# Patient Record
Sex: MALE | Race: WHITE | NOT HISPANIC OR LATINO | Employment: FULL TIME | URBAN - METROPOLITAN AREA
[De-identification: names, ages, dates, MRNs, and addresses within clinical notes are randomized per-mention and may not be internally consistent; named-entity substitution may affect disease eponyms.]

---

## 2018-02-12 ENCOUNTER — HOSPITAL ENCOUNTER (EMERGENCY)
Facility: HOSPITAL | Age: 43
Discharge: HOME | End: 2018-02-12
Attending: EMERGENCY MEDICINE

## 2018-02-12 VITALS
OXYGEN SATURATION: 97 % | DIASTOLIC BLOOD PRESSURE: 78 MMHG | BODY MASS INDEX: 35 KG/M2 | HEIGHT: 71 IN | HEART RATE: 74 BPM | SYSTOLIC BLOOD PRESSURE: 144 MMHG | RESPIRATION RATE: 16 BRPM | TEMPERATURE: 97 F | WEIGHT: 250 LBS
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
## 2018-02-12 DIAGNOSIS — S62.91XA CLOSED FRACTURE OF RIGHT HAND, INITIAL ENCOUNTER: Primary | ICD-10-CM

## 2018-02-12 PROCEDURE — 99283 EMERGENCY DEPT VISIT LOW MDM: CPT | Mod: 25

## 2018-02-12 PROCEDURE — 29125 APPL SHORT ARM SPLINT STATIC: CPT | Mod: RT

## 2018-02-12 PROCEDURE — 25000003 PHARM REV CODE 250: Performed by: PHYSICIAN ASSISTANT

## 2018-02-12 RX ORDER — OXYCODONE AND ACETAMINOPHEN 5; 325 MG/1; MG/1
1 TABLET ORAL EVERY 4 HOURS PRN
Qty: 15 TABLET | Refills: 0 | Status: SHIPPED | OUTPATIENT
Start: 2018-02-12 | End: 2018-03-12 | Stop reason: DRUGHIGH

## 2018-02-12 RX ORDER — IBUPROFEN 400 MG/1
800 TABLET ORAL
Status: COMPLETED | OUTPATIENT
Start: 2018-02-12 | End: 2018-02-12

## 2018-02-12 RX ADMIN — IBUPROFEN 800 MG: 400 TABLET, FILM COATED ORAL at 07:02

## 2018-02-13 NOTE — ED PROVIDER NOTES
Encounter Date: 2/12/2018       History     Chief Complaint   Patient presents with    Hand Injury     States fell down a few steps at around 2 pm today. Presents awake, alert with c/o pain and swelling to right hand. + painful ROM. No deformity.      Pb REAL, a 42 y.o. male that presents to the ED for right hand pain after he down a few steps and landed on the outside of his right hand around 2 PM this afternoon.  Since that time patient had increased pain with some swelling and some slight bruising.  Patient states that he has history of previous fracture requiring pinsto this hand when he was a teenager.  He is right-hand-dominant.  No treatments tried.        The history is provided by the patient.     Review of patient's allergies indicates:  No Known Allergies  History reviewed. No pertinent past medical history.  Past Surgical History:   Procedure Laterality Date    CERVICAL FUSION      HAND SURGERY Right     KNEE ARTHROSCOPY Bilateral     SHOULDER SURGERY Left     TONSILLECTOMY       History reviewed. No pertinent family history.  Social History   Substance Use Topics    Smoking status: Current Every Day Smoker    Smokeless tobacco: Never Used    Alcohol use Yes      Comment: social     Review of Systems   Constitutional: Negative for fever.   Gastrointestinal: Negative for nausea and vomiting.   Musculoskeletal: Positive for arthralgias (right hand pain ). Negative for joint swelling.   Skin: Positive for color change (to right hand ).   Allergic/Immunologic: Negative for immunocompromised state.   Neurological: Negative for dizziness, weakness and numbness.   Hematological: Negative for adenopathy.   Psychiatric/Behavioral: Negative for agitation and confusion.   All other systems reviewed and are negative.      Physical Exam     Initial Vitals [02/12/18 1651]   BP Pulse Resp Temp SpO2   (!) 156/92 92 15 97.4 °F (36.3 °C) 98 %      MAP       113.33         Physical Exam    Nursing note  and vitals reviewed.  Constitutional: He appears well-developed and well-nourished.   HENT:   Head: Normocephalic and atraumatic.   Right Ear: External ear normal.   Left Ear: External ear normal.   Nose: Nose normal.   Mouth/Throat: Oropharynx is clear and moist.   Eyes: EOM are normal.   Neck: Normal range of motion. Neck supple.   Cardiovascular: Normal rate and regular rhythm.   Pulmonary/Chest: Breath sounds normal. No respiratory distress.   Musculoskeletal: He exhibits no edema.        Right elbow: Normal.       Right wrist: Normal.        Right hand: He exhibits decreased range of motion, tenderness, bony tenderness and swelling. He exhibits normal capillary refill, no deformity and no laceration. Normal sensation noted. Normal strength noted.        Hands:  R hand: N/V intact    Lymphadenopathy:     He has no cervical adenopathy.   Neurological: He is alert and oriented to person, place, and time. No cranial nerve deficit.   Skin: Skin is warm and dry. Capillary refill takes less than 2 seconds. No rash and no abscess noted. No erythema.   Psychiatric: He has a normal mood and affect. Thought content normal.         ED Course   Splint Application  Date/Time: 2/12/2018 8:11 PM  Performed by: ABNER TAYLOR  Authorized by: SANTANA WALTERS   Location details: right hand  Splint type: ulnar gutter  Supplies used: plaster and cotton padding  Post-procedure: The splinted body part was neurovascularly unchanged following the procedure.  Patient tolerance: Patient tolerated the procedure well with no immediate complications  Comments: Splint placed by Stevenson Mathur         Labs Reviewed - No data to display     Imaging Results          X-Ray Hand 3 View Right (Final result)  Result time 02/12/18 17:12:18   Procedure changed from X-Ray Hand 2 View Right     Final result by Benny Mejia MD (02/12/18 17:12:18)                 Impression:      1.  Acute angulated comminuted intra-articular fracture of  the distal fifth carpal is above.      Electronically signed by: GRAZYNA SRIVASTAVA MD  Date:     02/12/18  Time:    17:12              Narrative:    Hand complete 3 view right    Clinical history: Trauma    Comparison: None    Findings:  3 views.    There is a comminuted fracture of the distal fifth metacarpal, with articular involvement.  There is diffuse edema about the hand.  Fracture fragments lie about the fracture plane.                                 Medical Decision Making:   Initial Assessment:   Right hand pain after fall   Differential Diagnosis:   Fracture, contusion, sprain, dislocation   Clinical Tests:   Radiological Study: Ordered and Reviewed  ED Management:  History presents to ED in NAD, afebrile and nontoxic.  He is TTP over fifth MCP with some swelling and mild bruising noted.  Motrin offered and pt declined.    X-ray shows acute angulated comminuted intra-articular fracture of the distal fifth carpal is above.  Ulnar gutter splint was applied.  Patient N/V intact.  She was instructed to follow-up hand surgeon for further evaluation.  Strict return precautions given and patient verbalized understanding.                 Attending Attestation:     Physician Attestation Statement for NP/PA:   I discussed this assessment and plan of this patient with the NP/PA, but I did not personally examine the patient. The face to face encounter was performed by the NP/PA.                  ED Course      Clinical Impression:   The primary encounter diagnosis was Closed fracture of right hand, initial encounter. A diagnosis of Fall was also pertinent to this visit.                           LIU DonaldsonC  02/12/18 2012       Kevin Shaw MD  02/12/18 2019

## 2018-02-13 NOTE — ED NOTES
Right hand pain after falling down steps around 2 pm today.  Hx of fracture to right 5th metacarpal at 16.  Pain increases with movement.  Swelling, redness and tenderness to 4th and 5th digits, cap refill < 2 sec, nailbeds pink.

## 2018-02-21 ENCOUNTER — TELEPHONE (OUTPATIENT)
Dept: ORTHOPEDICS | Facility: CLINIC | Age: 43
End: 2018-02-21

## 2018-02-21 NOTE — TELEPHONE ENCOUNTER
I spoke with Katlin and made the patient a new patient appointment with dr. Dutta. She was made aware of date, time and location.

## 2018-02-21 NOTE — TELEPHONE ENCOUNTER
----- Message from Jany Ortega sent at 2/21/2018 12:24 PM CST -----  Contact:  Katlin [phone]  Katlin requesting to speak with you regarding patient's worker's comp appt. Please call and advise.

## 2018-02-22 ENCOUNTER — OFFICE VISIT (OUTPATIENT)
Dept: ORTHOPEDICS | Facility: CLINIC | Age: 43
End: 2018-02-22
Payer: OTHER MISCELLANEOUS

## 2018-02-22 ENCOUNTER — HOSPITAL ENCOUNTER (OUTPATIENT)
Dept: RADIOLOGY | Facility: HOSPITAL | Age: 43
Discharge: HOME | End: 2018-02-22
Attending: ORTHOPAEDIC SURGERY
Payer: OTHER MISCELLANEOUS

## 2018-02-22 VITALS — WEIGHT: 250 LBS | HEIGHT: 71 IN | BODY MASS INDEX: 35 KG/M2

## 2018-02-22 DIAGNOSIS — M79.641 PAIN OF RIGHT HAND: Primary | ICD-10-CM

## 2018-02-22 DIAGNOSIS — S62.91XD CLOSED FRACTURE OF RIGHT HAND WITH ROUTINE HEALING: ICD-10-CM

## 2018-02-22 DIAGNOSIS — M79.641 PAIN OF RIGHT HAND: ICD-10-CM

## 2018-02-22 PROCEDURE — 73130 X-RAY EXAM OF HAND: CPT | Mod: TC,FY,RT

## 2018-02-22 PROCEDURE — 99204 OFFICE O/P NEW MOD 45 MIN: CPT | Mod: S$GLB,,, | Performed by: ORTHOPAEDIC SURGERY

## 2018-02-22 PROCEDURE — 99999 PR PBB SHADOW E&M-EST. PATIENT-LVL III: CPT | Mod: PBBFAC,,, | Performed by: ORTHOPAEDIC SURGERY

## 2018-02-22 PROCEDURE — 73130 X-RAY EXAM OF HAND: CPT | Mod: 26,RT,, | Performed by: RADIOLOGY

## 2018-02-22 PROCEDURE — 3008F BODY MASS INDEX DOCD: CPT | Mod: S$GLB,,, | Performed by: ORTHOPAEDIC SURGERY

## 2018-02-22 RX ORDER — OXYCODONE AND ACETAMINOPHEN 7.5; 325 MG/1; MG/1
1 TABLET ORAL EVERY 4 HOURS PRN
Qty: 40 TABLET | Refills: 0 | Status: SHIPPED | OUTPATIENT
Start: 2018-02-22 | End: 2018-03-12 | Stop reason: SDUPTHER

## 2018-02-22 NOTE — LETTER
February 22, 2018        Aaareferral Alli             [facility]  [address] [place] [address] [address]  [place] [address]  Phone: [phone]   Patient: Pb Montenegro   MR Number: [id number]   YOB: 1975   Date of Visit: 2/22/2018       Dear Dr. Carson:    Thank you for referring Pb Montenegro to me for evaluation. Below are the relevant portions of my assessment and plan of care.            If you have questions, please do not hesitate to call me. I look forward to following Pb along with you.    Sincerely,      Larry Dutta Jr., MD           CC  No Recipients

## 2018-02-22 NOTE — PROGRESS NOTES
INITIAL VISIT AND PREOP H AND P    CHIEF COMPLAINT:  Right hand fracture.    HISTORY OF PRESENT ILLNESS:  A 42-year-old male sustained injury to his right   hand eight days ago when he fell at work, landing on his right hand sustaining   injury to the hand.  He has had pain and swelling since, was referred for   possible surgery on the hand.    PAST MEDICAL HISTORY:  Unremarkable.    PAST SURGICAL HISTORY:  Include shoulder surgery, neck fusion, hand surgery,   knee arthroscopy and tonsillectomy.    FAMILY HISTORY:  Negative.    SOCIAL HISTORY:  The patient smokes daily, drinks alcohol socially.    REVIEW OF SYSTEMS:  Negative fever, chills, rashes.    CURRENT MEDICATIONS:  Reviewed on chart.    ALLERGIES:  None.    PHYSICAL EXAMINATION:  GENERAL:  Well-developed, well-nourished male in no acute distress, alert and   oriented x3.  HEENT:  Unremarkable.  LUNGS:  Clear to auscultation.  HEART:  Regular rate and rhythm.  ABDOMEN:  Soft, nontender.  EXTREMITIES:  Significant for the right hand, demonstrating tenderness, swelling   over the ulnar side of the hand in the fifth MP joint.  Clinical alignment of   the small finger is good.  Sensation intact.  No obvious deformity.  Range of   motion limited.    X-RAYS:  AP and lateral of the right hand demonstrate a comminuted fracture of   the fifth metacarpal head.  There is involvement of the joint.  There is some   comminution and displacement.    IMPRESSION:  Severely comminuted right fifth metacarpal head and neck fracture.    PLAN:  I explained the nature of problem to the patient.  I think this is a   difficult choice here on whether we do surgery or not, and I explained the   reasons.  Basically if we do not operate, he will end up with stiffness and   probably early arthritis of the joint.  On the other hand, if we proceed with   surgery for ORIF, we might be able to avoid some arthritic changes, but the   stiffness may be actually worse.  Either case will require  extensive physical   therapy and I explained that to him today.  He understands.    We have given this some consideration.  He would like to proceed with surgery   next week for ORIF right fifth metacarpal head and neck fracture.  The risks and   benefits of surgery explained to the patient.  In the meantime, he was given an   ulnar gutter splint, light duty work, Percocet for pain.  Follow up next week   for surgery.      MATY  dd: 02/22/2018 09:45:27 (CST)  td: 02/23/2018 04:29:09 (CST)  Doc ID   #[id number]  Job ID #[id number]    CC:

## 2018-02-22 NOTE — LETTER
February 22, 2018        Rubi Trejo MD  [address] [facility] [address]             [facility]  [address] [place] [address] [address]  [facility] [address]  Phone: [phone]   Patient: Pb Montenegro   MR Number: [id number]   YOB: 1975   Date of Visit: 2/22/2018       Dear Dr. Trejo:    Thank you for referring Pb Montenegro to me for evaluation. Below are the relevant portions of my assessment and plan of care.            If you have questions, please do not hesitate to call me. I look forward to following Pb along with you.    Sincerely,      Larry Dutta Jr., MD           CC  No Recipients

## 2018-02-26 ENCOUNTER — ANESTHESIA EVENT (OUTPATIENT)
Dept: SURGERY | Facility: HOSPITAL | Age: 43
End: 2018-02-26
Payer: OTHER MISCELLANEOUS

## 2018-02-26 ENCOUNTER — HOSPITAL ENCOUNTER (OUTPATIENT)
Dept: PREADMISSION TESTING | Facility: HOSPITAL | Age: 43
Discharge: HOME | End: 2018-02-26
Attending: ORTHOPAEDIC SURGERY
Payer: OTHER MISCELLANEOUS

## 2018-02-26 ENCOUNTER — CLINICAL SUPPORT (OUTPATIENT)
Dept: LAB | Facility: HOSPITAL | Age: 43
End: 2018-02-26
Attending: ORTHOPAEDIC SURGERY
Payer: OTHER MISCELLANEOUS

## 2018-02-26 VITALS
WEIGHT: 251 LBS | HEIGHT: 70 IN | TEMPERATURE: 99 F | HEART RATE: 78 BPM | DIASTOLIC BLOOD PRESSURE: 93 MMHG | BODY MASS INDEX: 35.93 KG/M2 | OXYGEN SATURATION: 98 % | RESPIRATION RATE: 18 BRPM | SYSTOLIC BLOOD PRESSURE: 133 MMHG

## 2018-02-26 DIAGNOSIS — Z01.818 PRE-OP TESTING: Primary | ICD-10-CM

## 2018-02-26 DIAGNOSIS — Z01.818 PRE-OP TESTING: ICD-10-CM

## 2018-02-26 PROCEDURE — 93005 ELECTROCARDIOGRAM TRACING: CPT

## 2018-02-26 PROCEDURE — 93010 ELECTROCARDIOGRAM REPORT: CPT | Mod: ,,, | Performed by: INTERNAL MEDICINE

## 2018-02-26 PROCEDURE — 93010 EKG 12-LEAD: ICD-10-PCS | Mod: ,,, | Performed by: INTERNAL MEDICINE

## 2018-02-26 RX ORDER — SODIUM CHLORIDE, SODIUM LACTATE, POTASSIUM CHLORIDE, CALCIUM CHLORIDE 600; 310; 30; 20 MG/100ML; MG/100ML; MG/100ML; MG/100ML
INJECTION, SOLUTION INTRAVENOUS CONTINUOUS
Status: CANCELLED | OUTPATIENT
Start: 2018-02-26

## 2018-02-26 RX ORDER — LIDOCAINE HYDROCHLORIDE 10 MG/ML
1 INJECTION, SOLUTION EPIDURAL; INFILTRATION; INTRACAUDAL; PERINEURAL ONCE
Status: CANCELLED | OUTPATIENT
Start: 2018-02-26 | End: 2018-02-26

## 2018-02-26 NOTE — ANESTHESIA PREPROCEDURE EVALUATION
02/26/2018  Pb Montenegro is a 42 y.o., male is scheduled for ORIF right metacarpal under reg/MAC/gen on 3/02/2018.        Past Surgical History:   Procedure Laterality Date    CERVICAL FUSION  2010    CLOSED REDUCTION HAND FRACTURE Right 1991    KNEE ARTHROSCOPY Bilateral 1990's    OPEN ANTERIOR SHOULDER RECONSTRUCTION Left 1993    TONSILLECTOMY           Anesthesia Evaluation    I have reviewed the Patient Summary Reports.    I have reviewed the Nursing Notes.   I have reviewed the Medications.     Review of Systems  Anesthesia Hx:  No problems with previous Anesthesia  History of prior surgery of interest to airway management or planning: cervical fusion. Previous anesthesia: General  Denies Personal Hx of Anesthesia complications.   Social:  Smoker, Social Alcohol Use    Hematology/Oncology:  Hematology Normal        EENT/Dental:EENT/Dental Normal   Cardiovascular:   Exercise tolerance: good Denies Hypertension.  Denies Dysrhythmias.   Denies Angina.        Pulmonary:   Denies Shortness of breath.    Renal/:  Renal/ Normal     Hepatic/GI:   GERD, well controlled    Musculoskeletal:   Brace to right hand   Neurological:  Neurology Normal    Endocrine:  Endocrine Normal        Physical Exam  General:  Well nourished, Large Beard    Airway/Jaw/Neck:  Airway Findings: Mouth Opening: Normal Tongue: Normal  General Airway Assessment: Adult  Mallampati: II  TM Distance: Normal, at least 6 cm  Jaw/Neck Findings:  Neck ROM: Extension Decreased, Mild  Neck Findings: Normal    Eyes/Ears/Nose:  EYES/EARS/NOSE FINDINGS: Normal   Dental:  Dental Findings: In tact   Chest/Lungs:  Chest/Lungs Clear    Heart/Vascular:  Heart Findings: Normal Heart murmur: negative    Abdomen:  Abdomen Findings: Normal    Musculoskeletal:  Musculoskeletal Findings: (right 4th and 5th metacarpal; brace in place) Tender Joint,  Swollen Joint     Mental Status:  Mental Status Findings: Normal        Anesthesia Plan  Type of Anesthesia, risks & benefits discussed:  Anesthesia Type:  MAC, regional, general  Patient's Preference:   Intra-op Monitoring Plan: standard ASA monitors  Intra-op Monitoring Plan Comments:   Post Op Pain Control Plan: per primary service following discharge from PACU, peripheral nerve block and multimodal analgesia  Post Op Pain Control Plan Comments:   Induction:   IV  Beta Blocker:  Patient is not currently on a Beta-Blocker (No further documentation required).       Informed Consent: Patient understands risks and agrees with Anesthesia plan.  Questions answered. Anesthesia consent signed with patient.  ASA Score: 2     Day of Surgery Review of History & Physical:  There are no significant changes.      Anesthesia Plan Notes: Anesthesia consent will be obtained prior to procedure on 3/02/2018.        Ready For Surgery From Anesthesia Perspective.

## 2018-02-26 NOTE — DISCHARGE INSTRUCTIONS
· Arrive on  3/2/2018  at  11:00 .  · Report to the 2nd floor Procedural Check In Room .   · Nothing to eat or drink after 5 AM the day of your procedure.  · You may take pain medications the morning of surgery with a small sip of water.                                                         · Please remove all body piercings and leave all your jewelery and valuables at home .  · Please remove your contact lenses.   · Wear loose comfortable clothing.  · You will not be able to drive that day, please make arrangement for transportation to and from your procedure.  · You cannot be alone for 24 hours after anesthesia. Make arrangements as needed.  · Shower the night before your procedure and the morning of your procedure with Hibiclens antibacterial solution.  · No lotions, creams or powders  · Stop Aspirin, Ibuprofen, Advil, Motrin, Aleve, Nuprin, Naprosyn (all NSAID Medication) or any other blood thinners 5 days before your procedure unless directed by your physician.  Also hold all over the counter vitamins and herbal supplements for 5 days prior to your procedure.  · You may take Tylenol or Acetaminophen up the day of surgery for any pain.        Call [phone] with any questions.          Anesthesia: General Anesthesia  Youre due to have surgery. During surgery, youll be given medication called anesthesia. (It is also called anesthetic.) This will keep you comfortable and pain-free. Your anesthesia provider will use general anesthesia. This sheet tells you more about it.  What is general anesthesia?    General anesthesia puts you into a state like deep sleep. It goes into the bloodstream (IV anesthetics), into the lungs (gas anesthetics), or both. You feel nothing during the procedure. You will not remember it. During the procedure, the anesthesia provider monitors you continuously. He or she checks your heart rate and rhythm, blood pressure, breathing, and blood oxygen.  · IV Anesthetics. IV anesthetics are  given through an IV line in your arm. Theyre often given first. This is so you are asleep before a gas anesthetic is started. Some kinds of IV anesthetics relieve pain. Others relax you. Your doctor will decide which kind is best in your case.  · Gas Anesthetics. Gas anesthetics are breathed into the lungs. They are often used to keep you asleep. They can be given through a facemask or a tube placed in your larynx or trachea (breathing tube).  · If you have a facemask, your anesthesia provider will most likely place it over your nose and mouth while youre still awake. Youll breathe oxygen through the mask as your IV anesthetic is started. Gas anesthetic may be added through the mask.  · If you have a tube in the larynx or trachea, it will be inserted into your throat after youre asleep.  Anesthesia tools and medications  You will likely have:  · IV anesthetics. These are put into an IV line into your bloodstream.  · Gas anesthetics. You breathe these anesthetics into your lungs, where they pass into your bloodstream.  · Pulse oximeter. This is a small clip that is attached to the end of your finger. This measures your blood oxygen level.  · Electrocardiography leads (electrodes). These are small sticky pads that are placed on your chest. They record your heart rate and rhythm.  · Blood pressure cuff. This reads your blood pressure.    Anesthesia safety  · Follow all instructions you are given for how long not to eat or drink before your procedure.  · Be sure your doctor knows what medications and drugs you take. This includes over-the-counter medications, herbs, supplements, alcohol or other drugs. You will be asked when those were last taken.  · Have an adult family member or friend drive you home after the procedure.  · For the first 24 hours after your surgery:  · Do not drive or use heavy equipment.  · Have a trusted family member or spouse make important decisions or sign documents.  · Avoid  alcohol.  · Have a responsible adult stay with you. He or she can watch for problems and help keep you safe.  © 8124-7951 [employer]. [address] [place] [address], [place] [address]. All rights reserved. This information is not intended as a substitute for professional medical care. Always follow your healthcare professional's instructions.        Pre-Op Bathing Instructions    Before surgery, you can play an important role in your own health.    Because skin is not sterile, we need to be sure that your skin is as free of germs as possible. By following the instructions below, you can reduce the number of germs on your skin before surgery.    IMPORTANT: You will need to shower with a special soap called Hibiclens*, available at any pharmacy, over the counter usually in the first aid isle.  If you are allergic to Chlorhexidine (the antiseptic in Hibiclens), use an antibacterial soap such as Dial Soap for your preoperative showers.  You will shower with Hibiclens the night before and the morning of surgery. Both the night before your surgery and the morning of your surgery see steps 2 and 3.   Do not use Hibiclens on the head, face or genitals to avoid injury to those areas.    STEP #1  1.  Shower with Hibiclens solution night before and the morning of surgery.      STEP #2: THE NIGHT BEFORE YOUR SURGERY     1. Do not shave the area of your body where your surgery will be performed.  2. Wash your hair as usual with your normal  Shampoo. Wash body shoulder to toes with your normal soap.  3. Squeeze Hibiclens into hand apply to surgical site.   4. Wash the site gently for five (5) minutes. Do not scrub your skin too hard.   5. Do not wash with your regular soap after Hibiclens is used.  6. Rinse your body thoroughly.  7. Pat yourself dry with a clean, soft towel.  8. Do not use lotion, cream, or powder.  9. Wear clean clothes.    STEP #3: THE MORNING OF YOUR SURGERY     1. Repeat Step #2.    * Not to be used by  people allergic to Chlorhexidine.

## 2018-03-01 DIAGNOSIS — S62.90XD CLOSED FRACTURE OF HAND WITH ROUTINE HEALING, UNSPECIFIED LATERALITY, SUBSEQUENT ENCOUNTER: Primary | ICD-10-CM

## 2018-03-02 ENCOUNTER — ANESTHESIA (OUTPATIENT)
Dept: SURGERY | Facility: HOSPITAL | Age: 43
End: 2018-03-02
Payer: OTHER MISCELLANEOUS

## 2018-03-02 ENCOUNTER — HOSPITAL ENCOUNTER (OUTPATIENT)
Facility: HOSPITAL | Age: 43
Discharge: HOME | End: 2018-03-02
Attending: ORTHOPAEDIC SURGERY | Admitting: ORTHOPAEDIC SURGERY
Payer: OTHER MISCELLANEOUS

## 2018-03-02 ENCOUNTER — SURGERY (OUTPATIENT)
Age: 43
End: 2018-03-02

## 2018-03-02 VITALS
HEIGHT: 70 IN | BODY MASS INDEX: 35.79 KG/M2 | RESPIRATION RATE: 20 BRPM | DIASTOLIC BLOOD PRESSURE: 85 MMHG | SYSTOLIC BLOOD PRESSURE: 129 MMHG | OXYGEN SATURATION: 98 % | WEIGHT: 250 LBS | TEMPERATURE: 98 F | HEART RATE: 65 BPM

## 2018-03-02 DIAGNOSIS — S62.91XD CLOSED FRACTURE OF RIGHT HAND WITH ROUTINE HEALING: ICD-10-CM

## 2018-03-02 DIAGNOSIS — M79.641 PAIN OF RIGHT HAND: ICD-10-CM

## 2018-03-02 PROCEDURE — 37000008 HC ANESTHESIA 1ST 15 MINUTES: Performed by: ORTHOPAEDIC SURGERY

## 2018-03-02 PROCEDURE — 71000016 HC POSTOP RECOV ADDL HR: Performed by: ORTHOPAEDIC SURGERY

## 2018-03-02 PROCEDURE — 36000708 HC OR TIME LEV III 1ST 15 MIN: Performed by: ORTHOPAEDIC SURGERY

## 2018-03-02 PROCEDURE — 36000706: Performed by: ORTHOPAEDIC SURGERY

## 2018-03-02 PROCEDURE — 63600175 PHARM REV CODE 636 W HCPCS: Performed by: ORTHOPAEDIC SURGERY

## 2018-03-02 PROCEDURE — 25000003 PHARM REV CODE 250: Performed by: NURSE PRACTITIONER

## 2018-03-02 PROCEDURE — 76942 ECHO GUIDE FOR BIOPSY: CPT | Performed by: ANESTHESIOLOGY

## 2018-03-02 PROCEDURE — 63600175 PHARM REV CODE 636 W HCPCS: Performed by: NURSE ANESTHETIST, CERTIFIED REGISTERED

## 2018-03-02 PROCEDURE — 36000709 HC OR TIME LEV III EA ADD 15 MIN: Performed by: ORTHOPAEDIC SURGERY

## 2018-03-02 PROCEDURE — 71000015 HC POSTOP RECOV 1ST HR: Performed by: ORTHOPAEDIC SURGERY

## 2018-03-02 PROCEDURE — 36000707: Performed by: ORTHOPAEDIC SURGERY

## 2018-03-02 PROCEDURE — 37000009 HC ANESTHESIA EA ADD 15 MINS: Performed by: ORTHOPAEDIC SURGERY

## 2018-03-02 PROCEDURE — 26608 TREAT METACARPAL FRACTURE: CPT | Mod: RT,,, | Performed by: ORTHOPAEDIC SURGERY

## 2018-03-02 PROCEDURE — C1769 GUIDE WIRE: HCPCS | Performed by: ORTHOPAEDIC SURGERY

## 2018-03-02 PROCEDURE — 63600175 PHARM REV CODE 636 W HCPCS: Performed by: ANESTHESIOLOGY

## 2018-03-02 PROCEDURE — 25000003 PHARM REV CODE 250: Performed by: ORTHOPAEDIC SURGERY

## 2018-03-02 DEVICE — GUIDEWIRE 1.25MM X 150MM: Type: IMPLANTABLE DEVICE | Site: HAND | Status: FUNCTIONAL

## 2018-03-02 RX ORDER — LIDOCAINE HYDROCHLORIDE 10 MG/ML
1 INJECTION, SOLUTION EPIDURAL; INFILTRATION; INTRACAUDAL; PERINEURAL ONCE
Status: DISCONTINUED | OUTPATIENT
Start: 2018-03-02 | End: 2018-03-02 | Stop reason: HOSPADM

## 2018-03-02 RX ORDER — OXYCODONE HYDROCHLORIDE 5 MG/1
10 TABLET ORAL EVERY 4 HOURS PRN
Status: DISCONTINUED | OUTPATIENT
Start: 2018-03-02 | End: 2018-03-02 | Stop reason: HOSPADM

## 2018-03-02 RX ORDER — PROPOFOL 10 MG/ML
VIAL (ML) INTRAVENOUS CONTINUOUS PRN
Status: DISCONTINUED | OUTPATIENT
Start: 2018-03-02 | End: 2018-03-02

## 2018-03-02 RX ORDER — SODIUM CHLORIDE, SODIUM LACTATE, POTASSIUM CHLORIDE, CALCIUM CHLORIDE 600; 310; 30; 20 MG/100ML; MG/100ML; MG/100ML; MG/100ML
INJECTION, SOLUTION INTRAVENOUS CONTINUOUS
Status: DISCONTINUED | OUTPATIENT
Start: 2018-03-02 | End: 2018-03-02 | Stop reason: HOSPADM

## 2018-03-02 RX ORDER — LIDOCAINE HCL/PF 100 MG/5ML
SYRINGE (ML) INTRAVENOUS
Status: DISCONTINUED | OUTPATIENT
Start: 2018-03-02 | End: 2018-03-02

## 2018-03-02 RX ORDER — KETOROLAC TROMETHAMINE 30 MG/ML
30 INJECTION, SOLUTION INTRAMUSCULAR; INTRAVENOUS ONCE
Status: DISCONTINUED | OUTPATIENT
Start: 2018-03-02 | End: 2018-03-02 | Stop reason: HOSPADM

## 2018-03-02 RX ORDER — CEFAZOLIN SODIUM 2 G/50ML
2 SOLUTION INTRAVENOUS
Status: DISCONTINUED | OUTPATIENT
Start: 2018-03-02 | End: 2018-03-02 | Stop reason: HOSPADM

## 2018-03-02 RX ORDER — ACETAMINOPHEN 325 MG/1
650 TABLET ORAL EVERY 4 HOURS PRN
Status: DISCONTINUED | OUTPATIENT
Start: 2018-03-02 | End: 2018-03-02 | Stop reason: HOSPADM

## 2018-03-02 RX ORDER — BACITRACIN 50000 [IU]/1
INJECTION, POWDER, FOR SOLUTION INTRAMUSCULAR
Status: DISCONTINUED | OUTPATIENT
Start: 2018-03-02 | End: 2018-03-02 | Stop reason: HOSPADM

## 2018-03-02 RX ORDER — HYDROCODONE BITARTRATE AND ACETAMINOPHEN 7.5; 325 MG/1; MG/1
1 TABLET ORAL EVERY 4 HOURS PRN
Qty: 40 TABLET | Refills: 0 | Status: SHIPPED | OUTPATIENT
Start: 2018-03-02 | End: 2018-03-26 | Stop reason: SDUPTHER

## 2018-03-02 RX ORDER — MIDAZOLAM HYDROCHLORIDE 1 MG/ML
INJECTION, SOLUTION INTRAMUSCULAR; INTRAVENOUS
Status: DISCONTINUED | OUTPATIENT
Start: 2018-03-02 | End: 2018-03-02

## 2018-03-02 RX ORDER — ONDANSETRON 8 MG/1
8 TABLET, ORALLY DISINTEGRATING ORAL EVERY 8 HOURS PRN
Status: DISCONTINUED | OUTPATIENT
Start: 2018-03-02 | End: 2018-03-02 | Stop reason: HOSPADM

## 2018-03-02 RX ORDER — FENTANYL CITRATE 50 UG/ML
INJECTION, SOLUTION INTRAMUSCULAR; INTRAVENOUS
Status: DISCONTINUED | OUTPATIENT
Start: 2018-03-02 | End: 2018-03-02

## 2018-03-02 RX ADMIN — SODIUM CHLORIDE, SODIUM LACTATE, POTASSIUM CHLORIDE, AND CALCIUM CHLORIDE: .6; .31; .03; .02 INJECTION, SOLUTION INTRAVENOUS at 09:03

## 2018-03-02 RX ADMIN — BACITRACIN 50000 UNITS: 5000 INJECTION, POWDER, FOR SOLUTION INTRAMUSCULAR at 11:03

## 2018-03-02 RX ADMIN — MIDAZOLAM 5 MG: 1 INJECTION INTRAMUSCULAR; INTRAVENOUS at 09:03

## 2018-03-02 RX ADMIN — LIDOCAINE HYDROCHLORIDE 100 MG: 20 INJECTION, SOLUTION INTRAVENOUS at 11:03

## 2018-03-02 RX ADMIN — CEFAZOLIN SODIUM 2 G: 2 SOLUTION INTRAVENOUS at 11:03

## 2018-03-02 RX ADMIN — FENTANYL CITRATE 100 MCG: 50 INJECTION, SOLUTION INTRAMUSCULAR; INTRAVENOUS at 11:03

## 2018-03-02 RX ADMIN — PROPOFOL 150 MCG/KG/MIN: 10 INJECTION, EMULSION INTRAVENOUS at 11:03

## 2018-03-02 NOTE — DISCHARGE INSTRUCTIONS
***LEAVE DRESSING ON, CLEAN, DRY, AND INTACT UNTIL CLINIC VISIT.  ***KEEP EXTREMITY ELEVATED.    DIET: You may resume your home diet. If nausea is present, increase your diet gradually with fluids and bland foods    ACTIVITY LEVEL: You have received sedation or an anesthetic, you may feel sleepy for several hours. Rest until you are more awake. Gradually resume your normal activities    Medications: Pain medication should be taken only if needed and as directed. If antibiotics are prescribed, the medication should be taken until completed. You will be given an updated list of you medications.    No driving, alcoholic beverages or signing legal documents for next 24 hours or while taking pain medication.       CALL THE DOCTOR:    For any obvious bleeding (some dried blood over the incision is normal).      Redness, swelling, foul smell around incision or fever over 101.   Shortness of breath, Coughing up Bloody sputum, Pains or Swelling in your Calves .   Persistent pain or nausea not relieved by medication.    If any unusual problems or difficulties occur contact your doctor. If you cannot contact your doctor but feel your signs and symptoms warrant a physicians attention return to the emergency room.      Discharge Instructions: After Your Surgery  Youve just had surgery. During surgery, you were given medicine called anesthesia to keep you relaxed and free of pain. After surgery, you may have some pain or nausea. This is common. Here are some tips for feeling better and getting well after surgery.     Stay on schedule with your medicine.   Going home  Your healthcare provider will show you how to take care of yourself when you go home. He or she will also answer your questions. Have an adult family member or friend drive you home. For the first 24 hours after your surgery:  · Do not drive or use heavy equipment.  · Do not make important decisions or sign legal papers.  · Do not drink alcohol.  · Have someone  stay with you, if needed. He or she can watch for problems and help keep you safe.  Be sure to go to all follow-up visits with your healthcare provider. And rest after your surgery for as long as your healthcare provider tells you to.  Coping with pain  If you have pain after surgery, pain medicine will help you feel better. Take it as told, before pain becomes severe. Also, ask your healthcare provider or pharmacist about other ways to control pain. This might be with heat, ice, or relaxation. And follow any other instructions your surgeon or nurse gives you.  Tips for taking pain medicine  To get the best relief possible, remember these points:  · Pain medicines can upset your stomach. Taking them with a little food may help.  · Most pain relievers taken by mouth need at least 20 to 30 minutes to start to work.  · Taking medicine on a schedule can help you remember to take it. Try to time your medicine so that you can take it before starting an activity. This might be before you get dressed, go for a walk, or sit down for dinner.  · Constipation is a common side effect of pain medicines. Call your healthcare provider before taking any medicines such as laxatives or stool softeners to help ease constipation. Also ask if you should skip any foods. Drinking lots of fluids and eating foods such as fruits and vegetables that are high in fiber can also help. Remember, do not take laxatives unless your surgeon has prescribed them.  · Drinking alcohol and taking pain medicine can cause dizziness and slow your breathing. It can even be deadly. Do not drink alcohol while taking pain medicine.  · Pain medicine can make you react more slowly to things. Do not drive or run machinery while taking pain medicine.  Your healthcare provider may tell you to take acetaminophen to help ease your pain. Ask him or her how much you are supposed to take each day. Acetaminophen or other pain relievers may interact with your prescription  medicines or other over-the-counter (OTC) medicines. Some prescription medicines have acetaminophen and other ingredients. Using both prescription and OTC acetaminophen for pain can cause you to overdose. Read the labels on your OTC medicines with care. This will help you to clearly know the list of ingredients, how much to take, and any warnings. It may also help you not take too much acetaminophen. If you have questions or do not understand the information, ask your pharmacist or healthcare provider to explain it to you before you take the OTC medicine.  Managing nausea  Some people have an upset stomach after surgery. This is often because of anesthesia, pain, or pain medicine, or the stress of surgery. These tips will help you handle nausea and eat healthy foods as you get better. If you were on a special food plan before surgery, ask your healthcare provider if you should follow it while you get better. These tips may help:  · Do not push yourself to eat. Your body will tell you when to eat and how much.  · Start off with clear liquids and soup. They are easier to digest.  · Next try semi-solid foods, such as mashed potatoes, applesauce, and gelatin, as you feel ready.  · Slowly move to solid foods. Dont eat fatty, rich, or spicy foods at first.  · Do not force yourself to have 3 large meals a day. Instead eat smaller amounts more often.  · Take pain medicines with a small amount of solid food, such as crackers or toast, to avoid nausea.     Call your surgeon if  · You still have pain an hour after taking medicine. The medicine may not be strong enough.  · You feel too sleepy, dizzy, or groggy. The medicine may be too strong.  · You have side effects like nausea, vomiting, or skin changes, such as rash, itching, or hives.       If you have obstructive sleep apnea  You were given anesthesia medicine during surgery to keep you comfortable and free of pain. After surgery, you may have more apnea spells because  of this medicine and other medicines you were given. The spells may last longer than usual.   At home:  · Keep using the continuous positive airway pressure (CPAP) device when you sleep. Unless your healthcare provider tells you not to, use it when you sleep, day or night. CPAP is a common device used to treat obstructive sleep apnea.  · Talk with your provider before taking any pain medicine, muscle relaxants, or sedatives. Your provider will tell you about the possible dangers of taking these medicines.  Date Last Reviewed: 12/1/2016  © 5757-5003 [employer]. [address] [place] [address]. All rights reserved. This information is not intended as a substitute for professional medical care. Always follow your healthcare professional's instructions.

## 2018-03-02 NOTE — ANESTHESIA PROCEDURE NOTES
Peripheral    Patient location during procedure: pre-op   Block not for primary anesthetic.  Reason for block: at surgeon's request and post-op pain management   Post-op Pain Location: right hand  Start time: 3/2/2018 9:30 AM  Timeout: 3/2/2018 9:30 AM   End time: 3/2/2018 9:36 AM  Surgery related to: right hand  Staffing  Anesthesiologist: JORDYN ESCOBAR  Performed: anesthesiologist   Preanesthetic Checklist  Completed: patient identified, site marked, surgical consent, pre-op evaluation, timeout performed, IV checked, risks and benefits discussed and monitors and equipment checked  Peripheral Block  Patient position: supine  Prep: ChloraPrep  Patient monitoring: heart rate, cardiac monitor, continuous pulse ox, continuous capnometry and frequent blood pressure checks  Block type: axillary  Laterality: right  Injection technique: single shot  Needle  Needle type: Stimuplex   Needle gauge: 21 G  Needle length: 4 in  Needle localization: anatomical landmarks and ultrasound guidance   -ultrasound image captured on disc.  Assessment  Injection assessment: negative aspiration and negative parasthesia  Paresthesia pain: none  Heart rate change: no  Slow fractionated injection: yes  Medications:  Bolus administered: 30 mL of 0.5 ropivacaine  Epinephrine added: none  Additional Notes  VSS.  [facility] RN monitoring vitals throughout procedure.  Patient tolerated procedure well.

## 2018-03-02 NOTE — ANESTHESIA POSTPROCEDURE EVALUATION
"Anesthesia Post Evaluation    Patient: Pb Montenegro    Procedure(s) Performed: Procedure(s) (LRB):  OPEN REDUCTION INTERNAL FIXATION-METACARPALS (Right)    Final Anesthesia Type: MAC  Patient location during evaluation: PACU  Patient participation: Yes- Able to Participate  Level of consciousness: awake and alert, oriented and awake  Post-procedure vital signs: reviewed and stable  Pain management: adequate  Airway patency: patent  PONV status at discharge: No PONV  Anesthetic complications: no      Cardiovascular status: blood pressure returned to baseline  Respiratory status: unassisted and room air  Hydration status: euvolemic  Follow-up not needed.        Visit Vitals  /79   Pulse 78   Temp 36.6 °C (97.9 °F) (Skin)   Resp 16   Ht 5' 10" (1.778 m)   Wt 113.4 kg (250 lb)   SpO2 (!) 94%   BMI 35.87 kg/m²       Pain/Tabby Score: Pain Assessment Performed: Yes (3/2/2018  9:13 AM)  Presence of Pain: complains of pain/discomfort (3/2/2018  9:13 AM)  Tabby Score: 10 (3/2/2018  9:45 AM)      "

## 2018-03-02 NOTE — H&P (VIEW-ONLY)
INITIAL VISIT AND PREOP H AND P    CHIEF COMPLAINT:  Right hand fracture.    HISTORY OF PRESENT ILLNESS:  A 42-year-old male sustained injury to his right   hand eight days ago when he fell at work, landing on his right hand sustaining   injury to the hand.  He has had pain and swelling since, was referred for   possible surgery on the hand.    PAST MEDICAL HISTORY:  Unremarkable.    PAST SURGICAL HISTORY:  Include shoulder surgery, neck fusion, hand surgery,   knee arthroscopy and tonsillectomy.    FAMILY HISTORY:  Negative.    SOCIAL HISTORY:  The patient smokes daily, drinks alcohol socially.    REVIEW OF SYSTEMS:  Negative fever, chills, rashes.    CURRENT MEDICATIONS:  Reviewed on chart.    ALLERGIES:  None.    PHYSICAL EXAMINATION:  GENERAL:  Well-developed, well-nourished male in no acute distress, alert and   oriented x3.  HEENT:  Unremarkable.  LUNGS:  Clear to auscultation.  HEART:  Regular rate and rhythm.  ABDOMEN:  Soft, nontender.  EXTREMITIES:  Significant for the right hand, demonstrating tenderness, swelling   over the ulnar side of the hand in the fifth MP joint.  Clinical alignment of   the small finger is good.  Sensation intact.  No obvious deformity.  Range of   motion limited.    X-RAYS:  AP and lateral of the right hand demonstrate a comminuted fracture of   the fifth metacarpal head.  There is involvement of the joint.  There is some   comminution and displacement.    IMPRESSION:  Severely comminuted right fifth metacarpal head and neck fracture.    PLAN:  I explained the nature of problem to the patient.  I think this is a   difficult choice here on whether we do surgery or not, and I explained the   reasons.  Basically if we do not operate, he will end up with stiffness and   probably early arthritis of the joint.  On the other hand, if we proceed with   surgery for ORIF, we might be able to avoid some arthritic changes, but the   stiffness may be actually worse.  Either case will require  extensive physical   therapy and I explained that to him today.  He understands.    We have given this some consideration.  He would like to proceed with surgery   next week for ORIF right fifth metacarpal head and neck fracture.  The risks and   benefits of surgery explained to the patient.  In the meantime, he was given an   ulnar gutter splint, light duty work, Percocet for pain.  Follow up next week   for surgery.      MATY  dd: 02/22/2018 09:45:27 (CST)  td: 02/23/2018 04:29:09 (CST)  Doc ID   #[id number]  Job ID #[id number]    CC:

## 2018-03-02 NOTE — OP NOTE
DATE OF PROCEDURE:  03/02/2018.    PREOPERATIVE DIAGNOSIS:  Right fifth metacarpal neck fracture.    POSTOPERATIVE DIAGNOSIS:  Right fifth metacarpal neck fracture.    OPERATIVE PROCEDURE:  Closed reduction and percutaneous pin fixation of right   fifth metacarpal neck fracture.    SURGEON:  Larry Dutta Jr., M.D.    ANESTHESIA:  Regional block.    ESTIMATED BLOOD LOSS:  Minimal.    COMPLICATIONS:  None.    SPECIMENS:  None.    BRIEF INDICATIONS:  This is a 42-year-old male who sustained right hand fracture   fifth metacarpal slightly displaced, taken to surgery for the above procedure.    OPERATIVE PROCEDURE IN DETAIL:  After operative consent was obtained, the   patient was brought to the Operating Room and placed supine on the operating   table.  Anesthesia by regional block was performed by the Anesthesia staff.    After the right arm was anesthetized, it was prepped and draped out in the   normal sterile fashion.  The C-arm brought in and closed reduction maneuver   performed on the right fifth metacarpal shaft and neck.  It appeared that the   head was intact for the most part, but just displaced slightly.  It was   carefully maneuvered back into place and two K wires were placed, one retrograde   and one antegrade, checked under C-arm control, good purchase achieved.  The   fracture appears stable and good range of motion of the finger.  The pins were   cut short above the skin, capped with pin caps, sterile dressing applied   followed by a well-padded ulnar gutter splint.  The patient was then brought to   the Recovery Room in stable condition.  All sponge and needle counts reported as   correct.  No complications.      NEPTALI/BRITTNEY  dd: 03/02/2018 12:03:17 (CST)  td: 03/02/2018 16:16:06 (CST)  Doc ID   #[id number]  Job ID #[id number]    CC:

## 2018-03-02 NOTE — PLAN OF CARE
Discharge criteria met,voicing desire to go home. Discharge instructions given to patient per TALYA Aguila Rn/ops; verbalized understanding. Discharge home via wheelchair in care of wife.

## 2018-03-12 ENCOUNTER — HOSPITAL ENCOUNTER (OUTPATIENT)
Dept: RADIOLOGY | Facility: HOSPITAL | Age: 43
Discharge: HOME | End: 2018-03-12
Attending: ORTHOPAEDIC SURGERY
Payer: OTHER MISCELLANEOUS

## 2018-03-12 ENCOUNTER — OFFICE VISIT (OUTPATIENT)
Dept: ORTHOPEDICS | Facility: CLINIC | Age: 43
End: 2018-03-12
Payer: OTHER MISCELLANEOUS

## 2018-03-12 DIAGNOSIS — M79.643 PAIN OF HAND, UNSPECIFIED LATERALITY: Primary | ICD-10-CM

## 2018-03-12 DIAGNOSIS — M79.641 PAIN OF RIGHT HAND: ICD-10-CM

## 2018-03-12 DIAGNOSIS — M79.643 PAIN OF HAND, UNSPECIFIED LATERALITY: ICD-10-CM

## 2018-03-12 DIAGNOSIS — S62.91XD CLOSED FRACTURE OF RIGHT HAND WITH ROUTINE HEALING: ICD-10-CM

## 2018-03-12 PROCEDURE — 73130 X-RAY EXAM OF HAND: CPT | Mod: TC,FY,RT

## 2018-03-12 PROCEDURE — 99024 POSTOP FOLLOW-UP VISIT: CPT | Mod: S$GLB,,, | Performed by: ORTHOPAEDIC SURGERY

## 2018-03-12 PROCEDURE — 99999 PR PBB SHADOW E&M-EST. PATIENT-LVL II: CPT | Mod: PBBFAC,,, | Performed by: ORTHOPAEDIC SURGERY

## 2018-03-12 PROCEDURE — 73130 X-RAY EXAM OF HAND: CPT | Mod: 26,RT,, | Performed by: RADIOLOGY

## 2018-03-12 RX ORDER — OXYCODONE AND ACETAMINOPHEN 7.5; 325 MG/1; MG/1
1 TABLET ORAL EVERY 4 HOURS PRN
Qty: 40 TABLET | Refills: 0 | Status: SHIPPED | OUTPATIENT
Start: 2018-03-12 | End: 2018-04-23

## 2018-03-12 NOTE — PROGRESS NOTES
HISTORY OF PRESENT ILLNESS:  Mr. Montenegro is about 11 days' postop percutaneous   pinning of fifth metacarpal neck fracture.  He is having now quite a bit of pain   in the right hand.  After removal of the splint, one of the pins has sort of   become loose and migrated underneath the skin.  The other pin is in good   position, no evidence of infection or anything.  Some swelling is noted.  He has   extreme pain with any manipulation of the hand.  Sensation intact.  No   drainage.    X-RAYS:  AP and lateral of right hand demonstrate well-aligned fifth metacarpal   neck fracture.  Two pins in place.    PLAN:  The migrating pin needed to be removed in the office today.  I injected   him with 1% Xylocaine and following this, I was able to catch the pin with the   needle  and remove it in the office, it was loose.  No problems with that   procedure.  We then wrapped up the other pin and put him in a well-padded ulnar   gutter splint.  I would like him to wear this full-time for the next three days   then he can take it off and start gentle bathing and showering.  Follow up in   two weeks for a recheck.      MATY  dd: 03/12/2018 14:19:54 (CDT)  td: 03/13/2018 07:40:25 (CDT)  Doc ID   #[id number]  Job ID #[id number]    CC:

## 2018-03-13 ENCOUNTER — CLINICAL SUPPORT (OUTPATIENT)
Dept: REHABILITATION | Facility: HOSPITAL | Age: 43
End: 2018-03-13
Payer: OTHER MISCELLANEOUS

## 2018-03-13 DIAGNOSIS — M79.641 RIGHT HAND PAIN: ICD-10-CM

## 2018-03-13 DIAGNOSIS — M25.60 DECREASED RANGE OF MOTION: ICD-10-CM

## 2018-03-13 PROCEDURE — 97165 OT EVAL LOW COMPLEX 30 MIN: CPT | Mod: PO

## 2018-03-13 PROCEDURE — 97110 THERAPEUTIC EXERCISES: CPT | Mod: PO

## 2018-03-13 NOTE — PATIENT INSTRUCTIONS
[facility]  OCCUPATIONAL THERAPY  HOME EXERCISE PROGRAM     Complete the following massages for 3-5 minutes each, 2-3x/day.                       Complete the following exercises with 10-15 repetitions each, 4-6x/day.                                                                                                   Therapist: LIZZ Quiros

## 2018-03-13 NOTE — PLAN OF CARE
Occupational Therapy -Hand / Wrist  Evaluation    Patient: Pb Montenegro  Date of Evaluation: 3/13/2018  Referring Physician:  Dr. Larry Dutta  Diagnosis: s/p CRPP of R 5th metacarpal  1. Right hand pain     2. Decreased range of motion       MRN: [id number]    Referral Orders:   Eval and treat     Start Time: 8:05  End Time: 9:00  Total Time: 55 min  IE x 45 min, TE x 10 min     Hand dominance: Right    Occupation:  Pt is a  with [employer]  Working presently:  yes  Workmen's Compensation:  yes    Date of onset: 3/2/18 sx date, initial injury on 2/12/18  Involved area:  R hand, 5th metacarpal  Mechanism of Injury:   Fell down stairs while at work  Past Medical History/Physical Systems Review:   Past Medical History:   Diagnosis Date    Arthritis      Past Surgical History:   Procedure Laterality Date    CERVICAL FUSION  2010    CLOSED REDUCTION HAND FRACTURE Right 1991    KNEE ARTHROSCOPY Bilateral 1990's    OPEN ANTERIOR SHOULDER RECONSTRUCTION Left 1993    TONSILLECTOMY           Living status: Pt lives with his wife and his 3 children.     Environmental Concerns/ Fall Risk:  None  Barriers to Learning: None   Cultural/Spiritual : None   Developmental/Education: None   Abuse/ Neglect: none   Nutritional Deficit: None   Language: None   Hearing/Vision Deficit: None   Other:     Subjective:  Pt reports I still have a lot of pain. The doctor had to take a pin out yesterday.    Pain   At rest: 6 out of 10  With work/ Activity: 8-9 out of 10  Sleeping: did not rate  Location of Pain : R hand at SF, RF, and 5th metacarpal    Patients goals for therapy are:  to have less pain and more function of R hand    Objective:   Observation  :Swelling, Redness noted at SF, dry skin and mild bruising still noted at R hand, especially SF and RF  **Pt wearing ulnar gutter brace on R hand     Sensation: pt reports numbness in R hand at fingertips and thumb especially; light touch grossly  intact  Scar / Wound: healing wound at 4th and 5th MP, blue cap at pin at ulnar aspect of 5th metacarpal,     Edema:  R hand    Ring / Small (in cm)  PP: 7.5 / 7.2   PIP: 7.2 / 6.7   MP : 6.5 / 6.3   DIP: 5.7 /. 5.7  DP: 5.5 / 5.3     MP's: 23.2  PPC: 24.4  PWC: 18.5        Range of Motion:   Wrist flex/ext: 64 / 60  RD/UD: 12 / 26    ROM: Thumb   /   Index   /   Middle   / Ring  /  Small   MP   WFL/WFL;   0/82;       0/72 ;      0/62;    0/25  PIP  WFL/WFL;    0/64;       0/67 ;      0/55;  -14/30  DIP        -----;        0/22;       0/22 ;      0/18;    -5/10  MOBLEY   WFL;         168;         161;       135;      46                                                    Manual Muscle Test: NT at this time                                       Strength: (KEITH Dynamometer in lbs.), Average 3 trials, Position II: TBA when appropriate      Functional Limitations:   Self Care / ADL: Limited use of R hand for ADLs, grooming, hygiene  Work/Activities: Limited use of R hand for writing, typing, opening jars or bottles, cleaning, yardwork  Leisure: Limited use of R hand for leisure activities    Focus on Therapeutic Outcomes (FOTO) Symptom Scale: Patient score indicates self perception of 57% impairment, limitation or restriction of function upon initial assessment.       Treatment Included:   OT evaluation and instruction in written HEP including retrograde massage, hand hygiene, gentle joint blocking to FDS/FDP of digits, gentle tendon glides (intrinsic -/+, composite fist) as tolerated, finger abd/add, digit ext as tolerated; and modalities for pain and inflammation management. Pt performed HEP x 5-10 reps each as tolerated.    Patient demonstrates good understanding of HEP/modality use for pain management.      Assessment:   Pt is a 43 y/o male with dx of s/p CRPP of R 5th metacarpal.  Pt presents with the below mentioned problems. Pt still currently working, but not having to use his R hand.  Pt with good  participation and motivation for recovery.  Pt would cont to benefit from skilled OT services to improve deficits and maximize functional use of R hand.    Problem List :        Decreased ROM   Decreased  strength   Decreased muscle strength   Decreased functional hand use   Increased pain   Edema   Scar Adhesions       Profile and History Assessment of Occupational Performance Level of Clinical Decision Making Complexity Score   Occupational Profile:   Pb Montenegro is a 42 y.o. male who lives with their family and is currently employed as a  for [employer]. Pb Montenegro has difficulty with  feeding, bathing, grooming and dressing  driving/transportation management and housework/household chores  affecting his/her daily functional abilities. His/her main goal for therapy is to have less pain and more function of R hand. Dominant hand affected    Comorbidities:   n/a    Medical and Therapy History Review:   Brief    LOW           Performance Deficits    Physical:  Joint Mobility  Joint Stability  Muscle Power/Strength  Muscle Endurance  Skin Integrity/Scar Formation  Edema   Strength  Gross Motor Coordination  Fine Motor Coordination  Pain    Cognitive:  No Deficits    Psychosocial:    No Deficits    MODERATE Clinical Decision Making:  low    Assessment Process:  Comprehensive  Assessments    Modification/Need for Assistance:  Not Necessary    Intervention Selection:  Multiple Treatment Options    LOW   low  Based on PMHX, co morbidities , data from assessments and functional level of assistance required with task and clinical presentation directly impacting function.       STGs (4 weeks):  1) initiate HEP, and modalities for pain and inflammation management  2)  Increase ROM of digits 10-15 degrees to increase functional hand use for ADLs/work/leisure activities  3)  Pt will report pain of no higher than 4/10 at the worst with daily functional activities  4)  Decrease edema  .2-.3 mm to increase joint mobility /flexibility for functional hand use.     Long term Goals: (discharge)   1)  Patient to be IND with HEP and modalities for pain management  2)  Pt will demonstrate functional composite fist of R hand in order to perform functional ADLs  3)   strength to be assessed when appropriate, and goals to be set accordingly  4) Pt will report little to no pain with daily functional activities  5)   Pt will present with little to no edema in R hand  6)  Pt will achieve FOTO score of no higher than 35% limitation with functional use of R hand    Plan:   Patient to be treated by Occupational Therapy    2    times per week for   8                   weeks  during the certification period from   3/13/2018     to  5/13/18   to achieve the established goals.  Treatment to include :  paraffin, fluidotherapy, manual therapy/joint mobilizations,  modalities for pain management, US 3mhz, therapeutic exercises/activities, strengthening, scar and edema management, as well as any other treatments deemed necessary based on the patient's needs or progress.           Therapist: LIZZ Quiros  Date: 3/13/18          I certify the need for these services furnished under this plan of treatment and while under my care    ____________________________________                         __________________  Physician/Referring Practitioner                                               Date of Signature

## 2018-03-22 ENCOUNTER — CLINICAL SUPPORT (OUTPATIENT)
Dept: REHABILITATION | Facility: HOSPITAL | Age: 43
End: 2018-03-22
Payer: OTHER MISCELLANEOUS

## 2018-03-22 DIAGNOSIS — M25.60 DECREASED RANGE OF MOTION: ICD-10-CM

## 2018-03-22 DIAGNOSIS — M79.641 RIGHT HAND PAIN: ICD-10-CM

## 2018-03-22 PROCEDURE — 97140 MANUAL THERAPY 1/> REGIONS: CPT | Mod: PO

## 2018-03-22 PROCEDURE — 97110 THERAPEUTIC EXERCISES: CPT | Mod: PO

## 2018-03-26 ENCOUNTER — OFFICE VISIT (OUTPATIENT)
Dept: ORTHOPEDICS | Facility: CLINIC | Age: 43
End: 2018-03-26
Payer: OTHER MISCELLANEOUS

## 2018-03-26 ENCOUNTER — HOSPITAL ENCOUNTER (OUTPATIENT)
Dept: RADIOLOGY | Facility: HOSPITAL | Age: 43
Discharge: HOME | End: 2018-03-26
Attending: ORTHOPAEDIC SURGERY
Payer: OTHER MISCELLANEOUS

## 2018-03-26 VITALS — HEIGHT: 70 IN | BODY MASS INDEX: 35.79 KG/M2 | WEIGHT: 250 LBS

## 2018-03-26 DIAGNOSIS — S62.91XD CLOSED FRACTURE OF RIGHT HAND WITH ROUTINE HEALING: ICD-10-CM

## 2018-03-26 DIAGNOSIS — M79.643 PAIN OF HAND, UNSPECIFIED LATERALITY: Primary | ICD-10-CM

## 2018-03-26 DIAGNOSIS — M79.643 PAIN OF HAND, UNSPECIFIED LATERALITY: ICD-10-CM

## 2018-03-26 PROCEDURE — 73120 X-RAY EXAM OF HAND: CPT | Mod: 26,RT,, | Performed by: RADIOLOGY

## 2018-03-26 PROCEDURE — 99999 PR PBB SHADOW E&M-EST. PATIENT-LVL III: CPT | Mod: PBBFAC,,, | Performed by: ORTHOPAEDIC SURGERY

## 2018-03-26 PROCEDURE — 73120 X-RAY EXAM OF HAND: CPT | Mod: TC,FY,RT

## 2018-03-26 PROCEDURE — 99024 POSTOP FOLLOW-UP VISIT: CPT | Mod: S$GLB,,, | Performed by: ORTHOPAEDIC SURGERY

## 2018-03-26 RX ORDER — HYDROCODONE BITARTRATE AND ACETAMINOPHEN 7.5; 325 MG/1; MG/1
1 TABLET ORAL EVERY 4 HOURS PRN
Qty: 40 TABLET | Refills: 0 | Status: SHIPPED | OUTPATIENT
Start: 2018-03-26 | End: 2019-04-30

## 2018-03-26 NOTE — PROGRESS NOTES
HISTORY OF PRESENT ILLNESS:  Mr. Montenegro in followup of percutaneous pin   fixation of fifth metacarpal neck fracture.  He is about three and half weeks'   postop, still having a lot of pain in the hand.  Previously, one of his pins   migrated and had to be removed.  He is back today with another pin which has   migrated under the skin.  Having pain from the pin site.    PHYSICAL EXAMINATION:  RIGHT HAND:  The remaining pin has migrated underneath the skin similar to the   previous pin.  Clinical alignment looks good.  No evidence of infection.    Sensation intact.  Range of motion limited.    X-RAYS:  AP and lateral right hand, demonstrates healing fracture of the fifth   metacarpal neck.  One pin in place.    PLAN:  I think we really do not have a choice but to remove the remaining pin   today.  He is having so much difficulty with these pins.    The pin was removed in the office without difficulty under sterile technique.    Following this, he was placed in a light bandage, placed back into the ulnar   gutter splint, which I would like him to wear at work, he can have it off at   home.  We will also order some OT for his right hand to start range of motion   exercises.  No heavy lifting.  Norco for pain.  Follow up in three weeks.      MATY  dd: 03/26/2018 14:15:20 (CDT)  td: 03/27/2018 11:14:15 (CDT)  Doc ID   #[id number]  Job ID #[id number]    CC:

## 2018-03-26 NOTE — PROGRESS NOTES
"OT DAILY TREATMENT NOTE      Start Time:  9:00  Stop Time:  9:50  Visit: 2/12; expires 3/1/19    PROCEDURES:    TIMED  Procedure Min.   TE 30   MT 10                 UNTIMED  Procedure Min.   Paraffin  10          Total Timed Minutes:  40  Total Timed Units:  3  Total Untimed Units:  1  Charges Billed/# of units:  1MT, 2TE      Progress/Current Status    Subjective:     Patient ID: Pb Montenegro is a 42 y.o. male.  Diagnosis:   1. Right hand pain     2. Decreased range of motion       Pain: 7-8 /10  Pt states "I've still been having a lot of pain. And my hand is still swollen."  Pt reports compliance with HEP and orthosis wear.    Objective:     Pt is 20 days status post, 1 pin remaining; wearing orthosis to clinic.    Pt seen by OT this session. Treatment consisted of the following:    Patient received paraffin bath to R hand(s) for 10 minutes to increase blood flow, circulation, pain management and for tissue elasticity prior to therex.   Performed RM to R digits/hand/wrist (avoiding the pin) x 8 min to decrease edema, improve joint mobility, decrease pain and improve lymphatic drainage to increase hand function. Pt received gentle joint mobs to IPs, grade 1-2. Pt received gentle PROM to IPs and IF &LF MPs x 10-15 reps each.   Date: 3/22/18    Visit: 2/12; expires 3/1/19   Wrist AROM (flex/ext/RD/UD) 10 reps   Tendon glides as tolerated, (intrinsic -/+, composite fist) 10 reps   Joint blocking to FDS/FDP of all digits 10 reps each   Reverse joint blocking to RF and SF PIP 10 reps each   AAROM of MP flex, all digits 10 reps   Finger abd/add 10 reps, as tolerated                 Assessment:     Pt tolerated tx fairly today. Cont c/o high pain and swelling in hand.  Mild redness and bruising still noted of small finger. Improved AROM of digits this date. SF AROM cont to be limited especially at MP, but slowly improving. Cont to be NWB.  Pt with good HEP and orthosis compliance. Pt reports he missed last visit due " to work schedule difficulties. Pt cont to present with moderate edema, stiffness, decreased ROM, pain, and limited use of R hand.  Pt progressing fairly well towards goals. Pt would cont to benefit from skilled OT services to maximize functional use of R hand.    Patient Education/Response:     Cont HEP, modalities, and edema management.    Plans and Goals:     Cont OT poc 2x/week for 8 weeks during certification period 3/13/18 to 5/13/18 in pursuit of established goals.    STGs (4 weeks):  1) initiate HEP, and modalities for pain and inflammation management  2)  Increase ROM of digits 10-15 degrees to increase functional hand use for ADLs/work/leisure activities  3)  Pt will report pain of no higher than 4/10 at the worst with daily functional activities  4)  Decrease edema .2-.3 mm to increase joint mobility /flexibility for functional hand use.      Long term Goals: (discharge)   1)  Patient to be IND with HEP and modalities for pain management  2)  Pt will demonstrate functional composite fist of R hand in order to perform functional ADLs  3)   strength to be assessed when appropriate, and goals to be set accordingly  4) Pt will report little to no pain with daily functional activities  5)   Pt will present with little to no edema in R hand  6)  Pt will achieve FOTO score of no higher than 35% limitation with functional use of R hand     LIZZ Quiros

## 2018-04-04 ENCOUNTER — CLINICAL SUPPORT (OUTPATIENT)
Dept: REHABILITATION | Facility: HOSPITAL | Age: 43
End: 2018-04-04
Payer: OTHER MISCELLANEOUS

## 2018-04-04 DIAGNOSIS — M25.60 DECREASED RANGE OF MOTION: ICD-10-CM

## 2018-04-04 DIAGNOSIS — M79.641 RIGHT HAND PAIN: ICD-10-CM

## 2018-04-04 PROCEDURE — 97110 THERAPEUTIC EXERCISES: CPT | Mod: PO

## 2018-04-04 PROCEDURE — 97140 MANUAL THERAPY 1/> REGIONS: CPT | Mod: PO

## 2018-04-04 NOTE — PROGRESS NOTES
"OT DAILY TREATMENT NOTE      Start Time:  10:00  Stop Time:  10:50  Visit: 3/12; expires 3/1/19    PROCEDURES:    TIMED  Procedure Min.   TE 30   MT 10                 UNTIMED  Procedure Min.   Paraffin  10          Total Timed Minutes:  40  Total Timed Units:  3  Total Untimed Units:  1  Charges Billed/# of units:  1MT, 2TE      Progress/Current Status    Subjective:     Patient ID: Pb Montenegro is a 42 y.o. male.  Diagnosis:   1. Right hand pain     2. Decreased range of motion       Pain: 4-6 /10  Pt states "they took the pin out. It still really hurts (re: R hand). I get shooting pains. And there are parts of my hand that are just numb."  Pt reports compliance with HEP and orthosis wear.    Objective:     Pt is 4 weeks 5 days status post, Pt wearing ulnar gutter brace.  **Last pin was removed at last visit    Pt seen by OT this session. Treatment consisted of the following:    Patient received paraffin bath to R hand(s) for 10 minutes to increase blood flow, circulation, pain management and for tissue elasticity prior to therex.   Performed RM to R digits/hand/wrist x 8 min to decrease edema, improve joint mobility, decrease pain and improve lymphatic drainage to increase hand function. Pt received gentle joint mobs to IPs, grade 1-2. Pt received gentle PROM to IPs and IF &LF MPs x 10-15 reps each.   Date: 4/4/18    Visit: 3/12; expires 3/1/19   Wrist AROM (flex/ext/RD/UD) 10 reps   Tendon glides as tolerated, (intrinsic -/+, composite fist), with eddy taping as tolerated 10 reps   Joint blocking to FDS/FDP of all digits 10 reps each   Reverse joint blocking to RF and SF PIP 10 reps each   AAROM of MP flex, all digits 10 reps   Finger abd/add 10 reps, as tolerated   Digit ext  10 reps   desensitization with dry rice 1 min         Assessment:     Pt tolerated tx fairly today. Cont c/o moderate to high pain and swelling in hand.  Last pin was removed at last ortho follow up. Improved AROM of digits this date. " SF AROM cont to be limited especially at MP, but slowly improving. Cont to be NWB.  Cont c/o numbness at SF, RF, ant on dorsal hand near thumb and IF.  Swelling improving. Pt with good HEP and orthosis compliance.  Pt cont to present with moderate edema, stiffness, decreased ROM, pain, and limited use of R hand.  Pt progressing fairly well towards goals. Pain cont to be limiting.  Pt would cont to benefit from skilled OT services to maximize functional use of R hand.    Patient Education/Response:     Cont HEP, modalities, and edema management.    Plans and Goals:     Cont OT poc 2x/week for 8 weeks during certification period 3/13/18 to 5/13/18 in pursuit of established goals. Take measurements next visit.    STGs (4 weeks):  1) initiate HEP, and modalities for pain and inflammation management  2)  Increase ROM of digits 10-15 degrees to increase functional hand use for ADLs/work/leisure activities  3)  Pt will report pain of no higher than 4/10 at the worst with daily functional activities  4)  Decrease edema .2-.3 mm to increase joint mobility /flexibility for functional hand use.      Long term Goals: (discharge)   1)  Patient to be IND with HEP and modalities for pain management  2)  Pt will demonstrate functional composite fist of R hand in order to perform functional ADLs  3)   strength to be assessed when appropriate, and goals to be set accordingly  4) Pt will report little to no pain with daily functional activities  5)   Pt will present with little to no edema in R hand  6)  Pt will achieve FOTO score of no higher than 35% limitation with functional use of R hand     LIZZ Quiros

## 2018-04-23 ENCOUNTER — HOSPITAL ENCOUNTER (OUTPATIENT)
Dept: RADIOLOGY | Facility: HOSPITAL | Age: 43
Discharge: HOME | End: 2018-04-23
Attending: ORTHOPAEDIC SURGERY
Payer: OTHER MISCELLANEOUS

## 2018-04-23 ENCOUNTER — OFFICE VISIT (OUTPATIENT)
Dept: ORTHOPEDICS | Facility: CLINIC | Age: 43
End: 2018-04-23
Payer: OTHER MISCELLANEOUS

## 2018-04-23 DIAGNOSIS — M79.641 PAIN OF RIGHT HAND: ICD-10-CM

## 2018-04-23 DIAGNOSIS — M79.641 PAIN OF RIGHT HAND: Primary | ICD-10-CM

## 2018-04-23 PROCEDURE — 99999 PR PBB SHADOW E&M-EST. PATIENT-LVL II: CPT | Mod: PBBFAC,,, | Performed by: ORTHOPAEDIC SURGERY

## 2018-04-23 PROCEDURE — 99024 POSTOP FOLLOW-UP VISIT: CPT | Mod: ,,, | Performed by: ORTHOPAEDIC SURGERY

## 2018-04-23 PROCEDURE — 73120 X-RAY EXAM OF HAND: CPT | Mod: 26,RT,, | Performed by: RADIOLOGY

## 2018-04-23 PROCEDURE — 73120 X-RAY EXAM OF HAND: CPT | Mod: TC,FY,RT

## 2018-04-23 PROCEDURE — 99212 OFFICE O/P EST SF 10 MIN: CPT | Mod: PBBFAC,25,PO | Performed by: ORTHOPAEDIC SURGERY

## 2018-04-23 NOTE — PROGRESS NOTES
HISTORY OF PRESENT ILLNESS:  Mr. Montenegro in followup of a percutaneous pin   fixation of fifth metacarpal neck fracture of the right hand.  He is about seven   weeks' postop.  He is doing well.  He had been off from therapy for a week or   two, but he is ready to get back into it now.  He is reporting some ongoing pain   and stiffness in the right hand and interestingly enough, he is reporting some   numbness and tingling in the ring and small finger of the right hand.  I do not   recall him mentioning this last visit, but he says it has been present for about   a month or so.    PHYSICAL EXAMINATION:  RIGHT HAND:  The fracture site is nontender, slight swelling, minimal tenderness   of the hand.  Range of motion of fingers slightly decreased.   strength   decreased.  Sensation slightly decreased in the right small finger and he has   what maybe a small Wartenberg sign with ulnar deviation of the small finger.    X-RAYS:  AP and lateral right hand demonstrate healed fracture of the fifth   metacarpal neck.    PLAN:  He can wean out of the ulnar gutter splint now.  Increase activities as   tolerated.  Continue light duty work for the hand.    Additionally, I have reordered therapy for his right hand and ordered a nerve   conduction study for the right hand to check for the numbness.  I do not know   what is causing the numbness or tingling.  It may be related to the fracture or   possibly unrelated, but I think we need to investigate further, so a nerve test   was ordered for the right hand and arm.  Follow up in three weeks.      MATY  dd: 04/23/2018 10:59:51 (CDT)  td: 04/24/2018 07:29:45 (CDT)  Doc ID   #[id number]  Job ID #[id number]    CC:

## 2018-05-17 ENCOUNTER — TELEPHONE (OUTPATIENT)
Dept: ORTHOPEDICS | Facility: CLINIC | Age: 43
End: 2018-05-17

## 2018-05-17 NOTE — TELEPHONE ENCOUNTER
----- Message from Cecily Lomeli sent at 5/17/2018  7:16 AM CDT -----  Good morning,  I received a email from the patients workers comp nurse  wanting to know if Dr. Dutta ordered an EMG. I see an order in Epic for the EMG but no referral. Please enter the referral in [facility] for the EMG so I can submit it to the [employer] comp carrier for authorization.    Thanks,  Cecily  EXT [phone]

## 2018-06-27 ENCOUNTER — TELEPHONE (OUTPATIENT)
Dept: ORTHOPEDICS | Facility: CLINIC | Age: 43
End: 2018-06-27

## 2018-07-16 ENCOUNTER — OFFICE VISIT (OUTPATIENT)
Dept: ORTHOPEDICS | Facility: CLINIC | Age: 43
End: 2018-07-16
Payer: OTHER MISCELLANEOUS

## 2018-07-16 VITALS — BODY MASS INDEX: 35.79 KG/M2 | WEIGHT: 250 LBS | HEIGHT: 70 IN

## 2018-07-16 DIAGNOSIS — S62.91XD CLOSED FRACTURE OF RIGHT HAND WITH ROUTINE HEALING: Primary | ICD-10-CM

## 2018-07-16 PROCEDURE — 99213 OFFICE O/P EST LOW 20 MIN: CPT | Mod: S$GLB,,, | Performed by: ORTHOPAEDIC SURGERY

## 2018-07-16 PROCEDURE — 99999 PR PBB SHADOW E&M-EST. PATIENT-LVL III: CPT | Mod: PBBFAC,,, | Performed by: ORTHOPAEDIC SURGERY

## 2018-07-16 RX ORDER — GABAPENTIN 300 MG/1
300 CAPSULE ORAL NIGHTLY
Qty: 30 CAPSULE | Refills: 1 | Status: SHIPPED | OUTPATIENT
Start: 2018-07-16 | End: 2019-03-28

## 2018-07-16 NOTE — PROGRESS NOTES
HISTORY OF PRESENT ILLNESS:  Mr. Montenegro in followup of right hand symptoms   after previous fifth metacarpal neck fracture.  He is about 4 months' postop   from surgery on the right hand.  At last visit, we ordered a nerve conduction   study because of ongoing symptoms in the right hand including numbness of the   fingers.  The results of the nerve tests show that he does have right carpal   tunnel syndrome, but also superimposed ulnar neuropathy.  Unfortunately, the   neurologist was not able to localize the site of impingement or compression.  I   went over those findings with him today.    The patient is still having numbness in the hand, but interestingly enough he   feels like his strength is a little better.    PHYSICAL EXAMINATION:  RIGHT HAND:  There is no atrophy noted.  No tenderness at the previous fracture   site.  Range of motion of fingers is full.   strength is slightly decreased.    Intrinsic strength is improved.    PLAN:  Since he is getting better, I have recommended that we hold off on any   surgery for now, especially for carpal tunnel release.  I have started him on   Neurontin 300 mg at bedtime to see if this helps and would like him to use a   wrist brace at night and Ultram for occasional use only.  Continue light duty   work.  Follow up in 1 month.      NEPTALI/IN  dd: 07/16/2018 15:29:11 (CDT)  td: 07/17/2018 03:42:36 (HEMA)  Doc ID   #[id number]  Job ID #[id number]    CC:

## 2018-12-03 ENCOUNTER — OFFICE VISIT (OUTPATIENT)
Dept: ORTHOPEDICS | Facility: CLINIC | Age: 43
End: 2018-12-03
Payer: COMMERCIAL

## 2018-12-03 VITALS — WEIGHT: 250 LBS | HEIGHT: 70 IN | BODY MASS INDEX: 35.79 KG/M2

## 2018-12-03 DIAGNOSIS — G56.01 CARPAL TUNNEL SYNDROME OF RIGHT WRIST: ICD-10-CM

## 2018-12-03 PROCEDURE — 99999 PR PBB SHADOW E&M-EST. PATIENT-LVL II: CPT | Mod: PBBFAC,,, | Performed by: ORTHOPAEDIC SURGERY

## 2018-12-03 PROCEDURE — 99213 OFFICE O/P EST LOW 20 MIN: CPT | Mod: 25,S$GLB,, | Performed by: ORTHOPAEDIC SURGERY

## 2018-12-03 PROCEDURE — 20526 THER INJECTION CARP TUNNEL: CPT | Mod: RT,S$GLB,, | Performed by: ORTHOPAEDIC SURGERY

## 2018-12-03 RX ORDER — TRIAMCINOLONE ACETONIDE 40 MG/ML
20 INJECTION, SUSPENSION INTRA-ARTICULAR; INTRAMUSCULAR
Status: COMPLETED | OUTPATIENT
Start: 2018-12-03 | End: 2018-12-03

## 2018-12-03 RX ADMIN — TRIAMCINOLONE ACETONIDE 20 MG: 40 INJECTION, SUSPENSION INTRA-ARTICULAR; INTRAMUSCULAR at 09:12

## 2018-12-03 NOTE — PROGRESS NOTES
HISTORY OF PRESENT ILLNESS:  Mr. Montenegro in followup of right fifth metacarpal   neck fracture, had some carpal tunnel syndrome noted on a recent nerve test.    His symptoms have been ongoing reporting some numbness in the hand.  He is light   duty work.    PHYSICAL EXAMINATION:  RIGHT HAND:  Fracture site is nontender over the fifth metacarpal, full range of   motion of all digits.   strength slightly decreased.  Sensation intact, but   Tinel sign is mildly positive at the wrist.    PLAN:  As he is having some carpal tunnel symptoms, I have recommended an   injection today.  After pause for timeout, he identified the right wrist,   injected the carpal tunnel area with combination of Kenalog 20 mg, 0.5 mL   Xylocaine, sterile technique, tolerated the procedure well without complication.    Continue wrist splint at night.    In terms of work, he is doing well, so I think he could be released to full   duty.  I want to see him back in a month to see how he is doing with the carpal   tunnel symptoms.      MATY  dd: 12/03/2018 09:03:16 (CST)  td: 12/04/2018 05:47:10 (CST)  Doc ID   #[id number]  Job ID #[id number]    CC:

## 2019-02-25 ENCOUNTER — OFFICE VISIT (OUTPATIENT)
Dept: ORTHOPEDICS | Facility: CLINIC | Age: 44
End: 2019-02-25
Payer: COMMERCIAL

## 2019-02-25 VITALS — HEIGHT: 70 IN | BODY MASS INDEX: 35.79 KG/M2 | WEIGHT: 250 LBS

## 2019-02-25 DIAGNOSIS — M79.641 RIGHT HAND PAIN: Primary | ICD-10-CM

## 2019-02-25 DIAGNOSIS — M79.673 PAIN OF FOOT, UNSPECIFIED LATERALITY: ICD-10-CM

## 2019-02-25 PROCEDURE — 99999 PR PBB SHADOW E&M-EST. PATIENT-LVL III: ICD-10-PCS | Mod: PBBFAC,,, | Performed by: ORTHOPAEDIC SURGERY

## 2019-02-25 PROCEDURE — 99999 PR PBB SHADOW E&M-EST. PATIENT-LVL III: CPT | Mod: PBBFAC,,, | Performed by: ORTHOPAEDIC SURGERY

## 2019-02-25 PROCEDURE — 99213 PR OFFICE/OUTPT VISIT, EST, LEVL III, 20-29 MIN: ICD-10-PCS | Mod: S$GLB,,, | Performed by: ORTHOPAEDIC SURGERY

## 2019-02-25 PROCEDURE — 99213 OFFICE O/P EST LOW 20 MIN: CPT | Mod: S$GLB,,, | Performed by: ORTHOPAEDIC SURGERY

## 2019-02-25 RX ORDER — NAPROXEN 500 MG/1
500 TABLET ORAL 2 TIMES DAILY WITH MEALS
Qty: 60 TABLET | Refills: 1 | Status: SHIPPED | OUTPATIENT
Start: 2019-02-25

## 2019-02-25 NOTE — PROGRESS NOTES
HISTORY OF PRESENT ILLNESS:  Mr. Montenegro is in for followup of right hand   symptoms, recently had a nerve conduction study, which showed some mild right   carpal tunnel syndrome, but no evidence of ulnar nerve involvement.  We tried an   injection last visit to the carpal tunnel.  He reports no improvement today.    He is currently on full duty.  His main complaint is numbness in the ring and   small finger of the right hand.    He does have some pain as well.    PHYSICAL EXAMINATION:  RIGHT HAND:  Tinel sign is negative at the wrist, negative at the elbow.  Range   of motion of the wrist and fingers is full.  Slightly decreased sensation in   ring and small fingers.    PLAN:  It is strange that the numbness in the ring and small fingers does not   really correlate with the nerve conduction study, so I think I need to refer him   to a neurologist for this.  We will make the referral and see him back after   the neurological exam.  We may, in fact, have to repeat the nerve test to see if   we figure out what is going on with the ulnar nerve.  Continue   anti-inflammatory medication by mouth, regular duty work.      /belkis  [id number]  yash(s)        NEPTALI/BRITTNEY  dd: 02/25/2019 09:01:14 (CST)  td: 02/25/2019 21:53:53 (CST)  Doc ID   #[id number]  Job ID #[id number]    CC:

## 2019-03-06 ENCOUNTER — TELEPHONE (OUTPATIENT)
Dept: ORTHOPEDICS | Facility: CLINIC | Age: 44
End: 2019-03-06

## 2019-03-06 NOTE — TELEPHONE ENCOUNTER
LM informing pt was auth through workers comp to schedule with neurology and podiatry. Advised pt to call back and schedule appt.

## 2019-03-19 ENCOUNTER — TELEPHONE (OUTPATIENT)
Dept: ORTHOPEDICS | Facility: CLINIC | Age: 44
End: 2019-03-19

## 2019-03-19 NOTE — TELEPHONE ENCOUNTER
----- Message from Madelaine Mayers sent at 3/19/2019  3:23 PM CDT -----  Contact: [phone]/Katlin with [facility] with [facility] requesting to speak with you regarding pt's appointment. Please advise.

## 2019-03-19 NOTE — TELEPHONE ENCOUNTER
Spoke with Katlin, she will call Dr. Clark's office to schedule WC appt. Could not find neurologist that accepted WC.

## 2019-03-28 ENCOUNTER — HOSPITAL ENCOUNTER (OUTPATIENT)
Dept: RADIOLOGY | Facility: HOSPITAL | Age: 44
Discharge: HOME | End: 2019-03-28
Attending: PODIATRIST
Payer: COMMERCIAL

## 2019-03-28 ENCOUNTER — OFFICE VISIT (OUTPATIENT)
Dept: PODIATRY | Facility: CLINIC | Age: 44
End: 2019-03-28
Payer: COMMERCIAL

## 2019-03-28 VITALS — HEIGHT: 70 IN | BODY MASS INDEX: 35.79 KG/M2 | WEIGHT: 250 LBS

## 2019-03-28 DIAGNOSIS — M25.572 LEFT ANKLE PAIN, UNSPECIFIED CHRONICITY: ICD-10-CM

## 2019-03-28 DIAGNOSIS — S93.402S MODERATE LEFT ANKLE SPRAIN, SEQUELA: ICD-10-CM

## 2019-03-28 DIAGNOSIS — M25.572 LEFT ANKLE PAIN, UNSPECIFIED CHRONICITY: Primary | ICD-10-CM

## 2019-03-28 PROCEDURE — 99203 OFFICE O/P NEW LOW 30 MIN: CPT | Mod: S$GLB,,, | Performed by: PODIATRIST

## 2019-03-28 PROCEDURE — 73610 XR ANKLE COMPLETE 3 VIEW LEFT: ICD-10-PCS | Mod: 26,LT,, | Performed by: RADIOLOGY

## 2019-03-28 PROCEDURE — 73630 X-RAY EXAM OF FOOT: CPT | Mod: TC,PN,LT

## 2019-03-28 PROCEDURE — 73630 X-RAY EXAM OF FOOT: CPT | Mod: 26,LT,, | Performed by: RADIOLOGY

## 2019-03-28 PROCEDURE — 99999 PR PBB SHADOW E&M-EST. PATIENT-LVL III: CPT | Mod: PBBFAC,,, | Performed by: PODIATRIST

## 2019-03-28 PROCEDURE — 99999 PR PBB SHADOW E&M-EST. PATIENT-LVL III: ICD-10-PCS | Mod: PBBFAC,,, | Performed by: PODIATRIST

## 2019-03-28 PROCEDURE — 73610 X-RAY EXAM OF ANKLE: CPT | Mod: 26,LT,, | Performed by: RADIOLOGY

## 2019-03-28 PROCEDURE — 73610 X-RAY EXAM OF ANKLE: CPT | Mod: TC,PN,LT

## 2019-03-28 PROCEDURE — 73630 XR FOOT COMPLETE 3 VIEW LEFT: ICD-10-PCS | Mod: 26,LT,, | Performed by: RADIOLOGY

## 2019-03-28 PROCEDURE — 99203 PR OFFICE/OUTPT VISIT, NEW, LEVL III, 30-44 MIN: ICD-10-PCS | Mod: S$GLB,,, | Performed by: PODIATRIST

## 2019-03-28 RX ORDER — NAPROXEN AND ESOMEPRAZOLE MAGNESIUM 500; 20 MG/1; MG/1
1 TABLET, DELAYED RELEASE ORAL 2 TIMES DAILY
Refills: 3 | COMMUNITY
Start: 2019-03-08

## 2019-03-28 RX ORDER — GABAPENTIN 300 MG/1
300 CAPSULE ORAL 3 TIMES DAILY
Qty: 90 CAPSULE | Refills: 0 | Status: SHIPPED | OUTPATIENT
Start: 2019-03-28 | End: 2019-05-23 | Stop reason: SDUPTHER

## 2019-03-28 NOTE — LETTER
March 28, 2019      Larry Dutta Jr., MD  [address]  [place] [address]           [facility]  [address]  [place] [address]  Phone: [phone]  Fax: [phone]          Patient: Pb Montenegro   MR Number: [id number]   YOB: 1975   Date of Visit: 3/28/2019       Dear Dr. Larry Dutta Jr.:    Thank you for referring Pb Montenegro to me for evaluation. Attached you will find relevant portions of my assessment and plan of care.    If you have questions, please do not hesitate to call me. I look forward to following Pb Montenegro along with you.    Sincerely,    Kole Clark, DPZULEYKA    Enclosure  CC:  No Recipients    If you would like to receive this communication electronically, please contact [email or web address] or [phone] to request more information on [employer] Link access.    For providers and/or their staff who would like to refer a patient to [facility], please contact us through our one-stop-shop provider referral line, [facility], at [phone].    If you feel you have received this communication in error or would no longer like to receive these types of communications, please e-mail [email or web address]

## 2019-03-28 NOTE — PROGRESS NOTES
"Subjective:      Patient ID: Pb Montenegro is a 43 y.o. male.    Chief Complaint: Foot Pain (left foot pain and swelling )    Pb Montenegro is a 43 y.o. male who  has a past medical history of Arthritis.    Patient states [place] when he stepped down off of the beltway, followed by pain and swelling.  He went to an Urgent care who told him nothing was broken, gave him a boot, and told him to follow up with podiatry.  Patient states swelling has gone down, but pain has persisted.  Points to lateral and posterior ankle.  States it hurts to flex his foot.  States he is unable to work, states it is too painful and he is too unstable to get around without his boot.  Interestingly with chart check note that the patient has a history of nerve pain to the right hand treated per Dr. Dutta with gabapentin 300 mg nightly.  Reports no dizziness.  No other complaints noted.    Vitals:    03/28/19 1509   Weight: 113.4 kg (250 lb)   Height: 5' 10" (1.778 m)   PainSc: 10-Worst pain ever      Past Medical History:   Diagnosis Date    Arthritis        Past Surgical History:   Procedure Laterality Date    CERVICAL FUSION  2010    CLOSED REDUCTION HAND FRACTURE Right 1991    KNEE ARTHROSCOPY Bilateral 1990's    OPEN ANTERIOR SHOULDER RECONSTRUCTION Left 1993    PINNING-CLOSED-HAND Right 3/2/2018    Performed by Larry Dutta Jr., [facility] at [facility] OR    TONSILLECTOMY         No family history on file.    Social History     Socioeconomic History    Marital status:      Spouse name: Not on file    Number of children: Not on file    Years of education: Not on file    Highest education level: Not on file   Occupational History    Not on file   Social Needs    Financial resource strain: Not on file    Food insecurity:     Worry: Not on file     Inability: Not on file    Transportation needs:     Medical: Not on file     Non-medical: Not on file   Tobacco Use    " Smoking status: Current Every Day Smoker    Smokeless tobacco: Never Used   Substance and Sexual Activity    Alcohol use: Yes     Comment: social    Drug use: No    Sexual activity: Not on file   Lifestyle    Physical activity:     Days per week: Not on file     Minutes per session: Not on file    Stress: Not on file   Relationships    Social connections:     Talks on phone: Not on file     Gets together: Not on file     Attends [employer] service: Not on file     Active member of club or organization: Not on file     Attends meetings of clubs or organizations: Not on file     Relationship status: Not on file    Intimate partner violence:     Fear of current or ex partner: Not on file     Emotionally abused: Not on file     Physically abused: Not on file     Forced sexual activity: Not on file   Other Topics Concern    Not on file   Social History Narrative    Not on file       Current Outpatient Medications   Medication Sig Dispense Refill    gabapentin (NEURONTIN) 300 MG capsule Take 1 capsule (300 mg total) by mouth 3 (three) times daily. 90 capsule 0    naproxen (EC NAPROSYN) 500 MG EC tablet Take 1 tablet (500 mg total) by mouth 2 (two) times daily with meals. 60 tablet 1    VIMOVO 500-20 mg TbID Take 1 tablet by mouth 2 (two) times daily.  3    hydrocodone-acetaminophen 7.5-325mg (NORCO) 7.5-325 mg per tablet Take 1 tablet by mouth every 4 (four) hours as needed. 40 tablet 0     No current facility-administered medications for this visit.        Review of patient's allergies indicates:   Allergen Reactions    Tuberculin ppd chris test Hives         Review of Systems   Constitution: Negative for chills and fever.   HENT: Negative for congestion.    Cardiovascular: Positive for leg swelling. Negative for chest pain and claudication.   Respiratory: Negative for cough and shortness of breath.    Skin: Negative for color change, dry skin, poor wound healing, suspicious lesions and unusual hair  distribution.   Musculoskeletal: Positive for back pain, joint pain, joint swelling, muscle weakness and myalgias. Negative for falls.   Gastrointestinal: Negative for nausea and vomiting.   Neurological: Negative for loss of balance, numbness, paresthesias, sensory change and tremors.   Psychiatric/Behavioral: Negative for altered mental status. The patient is not nervous/anxious.            Objective:      Physical Exam   Constitutional: He is oriented to person, place, and time. He appears well-developed and well-nourished. No distress.   HENT:   Head: Atraumatic.   Neck: No tracheal deviation present.   Cardiovascular:   Pulses:       Dorsalis pedis pulses are 2+ on the right side, and 2+ on the left side.        Posterior tibial pulses are 2+ on the right side, and 2+ on the left side.   Musculoskeletal: He exhibits edema and tenderness. He exhibits no deformity.   Pain to ATFL, medial and lateral ankle gutters and achilles tendon just proximal to insertion.  Mild pain to peroneal tendons.  Muscle strength to the left foot ankle region is noted to be +4/5 and guarded.  Negative anterior drawer sign left ankle.  Range of motion of the left ankle and foot does produce a moderate amount of pain.  Pain elicited with light touch to the lateral left ankle in overlying the Achilles tendon region.  No palpable defect felt over the Achilles tendon. Majority the pain is to the middle aspect of the Achilles tendon. There is also some pain along the posterior tibial and peroneal tendon distributions left ankle.    Negative drawer test, stress eversion test guarded    No pain to medial or lateral malleolus, 5th met styloid process, navicular tubercle, medial to lateral calcaneal squeeze.     Neurological: He is alert and oriented to person, place, and time. He displays no atrophy.   Reflex Scores:       Patellar reflexes are 2+ on the right side and 2+ on the left side.       Achilles reflexes are 2+ on the right side and  2+ on the left side.  Negative tinels sign   Skin: Capillary refill takes less than 2 seconds. No rash noted. He is not diaphoretic. No erythema.     Otherwise no open wounds, interdigital macerations, or calluses.  .       Psychiatric: He has a normal mood and affect. His behavior is normal.             Assessment:       Encounter Diagnoses   Name Primary?    Left ankle pain, unspecified chronicity Yes    Moderate left ankle sprain, sequela          Plan:       Pb was seen today for foot pain.    Diagnoses and all orders for this visit:    Left ankle pain, unspecified chronicity  -     X-Ray Ankle Complete Left; Future  -     X-Ray Foot Complete Left; Future  -     MRI Ankle Without Contrast Left; Future    Moderate left ankle sprain, sequela  -     X-Ray Ankle Complete Left; Future  -     X-Ray Foot Complete Left; Future  -     MRI Ankle Without Contrast Left; Future    Other orders  -     gabapentin (NEURONTIN) 300 MG capsule; Take 1 capsule (300 mg total) by mouth 3 (three) times daily.      I counseled the patient on his conditions, their implications and medical management.    Increased dose of gabapentin    Ordered MRI, and x-rays of ankle and foot to evaluated for ankle sprain/ achilles tendon tear.      Continue in Cam boot.      ICE Elevate foot    RTC 2 weeks    Assisted by Harvinder Muniz, PGY2    I have personally taken the history and examined this patient and agree with the resident's note as stated as above.   Kole Clark DPM, FACFAS    Discussed appropriate titration of gabapentin to 300 mg t.i.d..    Clinically suspect underlying neuropathy or chronic regional pain syndrome to the left ankle region.  This was discussed with the patient in detail he does express that his wife has CRPS and he is very familiar of the condition.    MRI was ordered to assess for any injured tendon or ligament or other underlying pathology that would explain his pain.  If nothing significant is found the etiology of  neuropathy or CRPS is more likely.  He will at that time require referral to interventional pain management and physical therapy.    Continue limited protective weight-bearing with orthopedic boot left lower extremity.    Rest ice and elevate as needed.    Return to clinic within 2 weeks following the MRI.    Left ankle and foot x-ray rev reviewed following the visit.  Note no fracture dislocation.  There is a mild reduction in the tib-fib overlap on the ankle mortise view which could indicate a possible injury to the syndesmotic ligament.  MRI would be more specific.

## 2019-03-28 NOTE — PATIENT INSTRUCTIONS
Take one every night before sleep x 7 days then increase to twice a day x 7 days followed by three times a day if needed and or tolerating well.

## 2019-04-04 ENCOUNTER — HOSPITAL ENCOUNTER (OUTPATIENT)
Dept: RADIOLOGY | Facility: HOSPITAL | Age: 44
Discharge: HOME | End: 2019-04-04
Attending: PODIATRIST
Payer: COMMERCIAL

## 2019-04-04 DIAGNOSIS — S93.402S MODERATE LEFT ANKLE SPRAIN, SEQUELA: ICD-10-CM

## 2019-04-04 DIAGNOSIS — M25.572 LEFT ANKLE PAIN, UNSPECIFIED CHRONICITY: ICD-10-CM

## 2019-04-04 PROCEDURE — 73721 MRI JNT OF LWR EXTRE W/O DYE: CPT | Mod: TC,PO,LT

## 2019-04-15 ENCOUNTER — OFFICE VISIT (OUTPATIENT)
Dept: PODIATRY | Facility: CLINIC | Age: 44
End: 2019-04-15
Payer: COMMERCIAL

## 2019-04-15 VITALS — HEIGHT: 70 IN | WEIGHT: 250 LBS | BODY MASS INDEX: 35.79 KG/M2

## 2019-04-15 DIAGNOSIS — M89.9 OSTEOCHONDRAL LESION OF TALAR DOME: ICD-10-CM

## 2019-04-15 DIAGNOSIS — M25.372 ANKLE INSTABILITY, LEFT: ICD-10-CM

## 2019-04-15 DIAGNOSIS — M25.572 LEFT ANKLE PAIN, UNSPECIFIED CHRONICITY: Primary | ICD-10-CM

## 2019-04-15 DIAGNOSIS — Z01.818 PREOP TESTING: ICD-10-CM

## 2019-04-15 DIAGNOSIS — S93.402S MODERATE LEFT ANKLE SPRAIN, SEQUELA: ICD-10-CM

## 2019-04-15 DIAGNOSIS — M94.9 OSTEOCHONDRAL LESION OF TALAR DOME: ICD-10-CM

## 2019-04-15 PROCEDURE — 99214 PR OFFICE/OUTPT VISIT, EST, LEVL IV, 30-39 MIN: ICD-10-PCS | Mod: S$GLB,,, | Performed by: PODIATRIST

## 2019-04-15 PROCEDURE — 99999 PR PBB SHADOW E&M-EST. PATIENT-LVL III: CPT | Mod: PBBFAC,,, | Performed by: PODIATRIST

## 2019-04-15 PROCEDURE — 99999 PR PBB SHADOW E&M-EST. PATIENT-LVL III: ICD-10-PCS | Mod: PBBFAC,,, | Performed by: PODIATRIST

## 2019-04-15 PROCEDURE — 99214 OFFICE O/P EST MOD 30 MIN: CPT | Mod: S$GLB,,, | Performed by: PODIATRIST

## 2019-04-15 RX ORDER — SODIUM CHLORIDE 0.9 % (FLUSH) 0.9 %
10 SYRINGE (ML) INJECTION
Status: SHIPPED | OUTPATIENT
Start: 2019-04-15

## 2019-04-15 RX ORDER — LIDOCAINE HYDROCHLORIDE 10 MG/ML
1 INJECTION, SOLUTION EPIDURAL; INFILTRATION; INTRACAUDAL; PERINEURAL ONCE
Status: CANCELLED | OUTPATIENT
Start: 2019-04-15 | End: 2019-04-15

## 2019-04-15 NOTE — LETTER
April 16, 2019      Larry Dutta Jr., MD  [address]  [place] [address]           [facility]  [address]  [place] [address]  Phone: [phone]  Fax: [phone]          Patient: Pb Montenegro   MR Number: [id number]   YOB: 1975   Date of Visit: 4/15/2019       Dear Dr. Larry Dutta Jr.:    Thank you for referring Pb Montenegro to me for evaluation. Attached you will find relevant portions of my assessment and plan of care.    If you have questions, please do not hesitate to call me. I look forward to following Pb Montenegro along with you.    Sincerely,    Kole Clark, DPZULEYKA    Enclosure  CC:  No Recipients    If you would like to receive this communication electronically, please contact [email or web address] or [phone] to request more information on [employer] Link access.    For providers and/or their staff who would like to refer a patient to [facility], please contact us through our one-stop-shop provider referral line, [facility], at [phone].    If you feel you have received this communication in error or would no longer like to receive these types of communications, please e-mail [email or web address]

## 2019-04-16 NOTE — PROGRESS NOTES
"Subjective:      Patient ID: Pb Montenegro is a 43 y.o. male.    Chief Complaint: Follow-up (left ankle )    Pb Montenegro is a 43 y.o. male who  has a past medical history of Arthritis.    Patient states that [place] pop when he stepped down off of the beltway, followed by pain and swelling.  He went to an Urgent care who told him nothing was broken, gave him a boot, and told him to follow up with podiatry.  Patient states swelling has gone down, but pain has persisted.  Points to lateral and posterior ankle.  States it hurts to flex his foot.  States he is unable to work, states it is too painful and he is too unstable to get around without his boot.  Interestingly with chart check note that the patient has a history of nerve pain to the right hand treated per Dr. Dutta with gabapentin 300 mg nightly.  Reports no dizziness.  No other complaints noted.    4/15/19: Pt presents for left ankle f/u, s/p MRI. No new pedal complaints    Vitals:    04/15/19 1534   Weight: 113.4 kg (250 lb)   Height: 5' 10" (1.778 m)   PainSc:   5      Past Medical History:   Diagnosis Date    Arthritis        Past Surgical History:   Procedure Laterality Date    CERVICAL FUSION  2010    CLOSED REDUCTION HAND FRACTURE Right 1991    KNEE ARTHROSCOPY Bilateral 1990's    OPEN ANTERIOR SHOULDER RECONSTRUCTION Left 1993    PINNING-CLOSED-HAND Right 3/2/2018    Performed by [facility] Jr[facility] at [facility] OR    TONSILLECTOMY         History reviewed. No pertinent family history.    Social History     Socioeconomic History    Marital status:      Spouse name: Not on file    Number of children: Not on file    Years of education: Not on file    Highest education level: Not on file   Occupational History    Not on file   Social Needs    Financial resource strain: Not on file    Food insecurity:     Worry: Not on file     Inability: Not on file    Transportation needs:     " Medical: Not on file     Non-medical: Not on file   Tobacco Use    Smoking status: Current Every Day Smoker    Smokeless tobacco: Never Used   Substance and Sexual Activity    Alcohol use: Yes     Comment: social    Drug use: No    Sexual activity: Not on file   Lifestyle    Physical activity:     Days per week: Not on file     Minutes per session: Not on file    Stress: Not on file   Relationships    Social connections:     Talks on phone: Not on file     Gets together: Not on file     Attends [employer] service: Not on file     Active member of club or organization: Not on file     Attends meetings of clubs or organizations: Not on file     Relationship status: Not on file   Other Topics Concern    Not on file   Social History Narrative    Not on file       Current Outpatient Medications   Medication Sig Dispense Refill    gabapentin (NEURONTIN) 300 MG capsule Take 1 capsule (300 mg total) by mouth 3 (three) times daily. 90 capsule 0    hydrocodone-acetaminophen 7.5-325mg (NORCO) 7.5-325 mg per tablet Take 1 tablet by mouth every 4 (four) hours as needed. 40 tablet 0    naproxen (EC NAPROSYN) 500 MG EC tablet Take 1 tablet (500 mg total) by mouth 2 (two) times daily with meals. 60 tablet 1    VIMOVO 500-20 mg TbID Take 1 tablet by mouth 2 (two) times daily.  3     Current Facility-Administered Medications   Medication Dose Route Frequency Provider Last Rate Last Dose    sodium chloride 0.9% flush 10 mL  10 mL Intravenous PRN Kole Clark DPM           Review of patient's allergies indicates:   Allergen Reactions    Tuberculin ppd chris test Hives         Review of Systems   Constitution: Negative for chills and fever.   HENT: Negative for congestion.    Cardiovascular: Positive for leg swelling. Negative for chest pain and claudication.   Respiratory: Negative for cough and shortness of breath.    Skin: Negative for color change, dry skin, poor wound healing, suspicious lesions and unusual  hair distribution.   Musculoskeletal: Positive for back pain, joint pain, joint swelling, muscle weakness and myalgias. Negative for falls.   Gastrointestinal: Negative for nausea and vomiting.   Neurological: Negative for loss of balance, numbness, paresthesias, sensory change and tremors.   Psychiatric/Behavioral: Negative for altered mental status. The patient is not nervous/anxious.            Objective:      Physical Exam   Constitutional: He is oriented to person, place, and time. He appears well-developed and well-nourished. No distress.   HENT:   Head: Atraumatic.   Neck: No tracheal deviation present.   Cardiovascular:   Pulses:       Dorsalis pedis pulses are 2+ on the right side, and 2+ on the left side.        Posterior tibial pulses are 2+ on the right side, and 2+ on the left side.   Musculoskeletal: He exhibits edema and tenderness. He exhibits no deformity.   Pain to ATFL, medial and lateral ankle gutters and achilles tendon just proximal to insertion.  Mild pain to peroneal tendons.  Muscle strength to the left foot ankle region is noted to be +4/5 and guarded.  Negative anterior drawer sign left ankle.  Range of motion of the left ankle and foot does produce a moderate amount of pain.  Pain elicited with light touch to the lateral left ankle in overlying the Achilles tendon region.  No palpable defect felt over the Achilles tendon. Majority the pain is to the middle aspect of the Achilles tendon. There is also some pain along the posterior tibial and peroneal tendon distributions left ankle.    Anterior drawer test questionable, stress eversion test guarded    No pain to medial or lateral malleolus, 5th met styloid process, navicular tubercle, medial to lateral calcaneal squeeze.     Neurological: He is alert and oriented to person, place, and time. He displays no atrophy.   Reflex Scores:       Patellar reflexes are 2+ on the right side and 2+ on the left side.       Achilles reflexes are 2+ on  the right side and 2+ on the left side.  Negative tinels sign   Skin: Capillary refill takes less than 2 seconds. No rash noted. He is not diaphoretic. No erythema.     Otherwise no open wounds, interdigital macerations, or calluses.  .       Psychiatric: He has a normal mood and affect. His behavior is normal.             Assessment:       Encounter Diagnoses   Name Primary?    Left ankle pain, unspecified chronicity Yes    Moderate left ankle sprain, sequela     Ankle instability, left     Osteochondral lesion of talar dome     Preop testing          Plan:       Pb was seen today for follow-up.    Diagnoses and all orders for this visit:    Left ankle pain, unspecified chronicity  -     Ambulatory referral to Family Practice  -     Case Request Operating Room: ARTHROSCOPY, ANKLE, REPAIR, LIGAMENT, ANKLE  -     Place in Outpatient; Standing  -     Full code; Standing  -     Insert peripheral IV; Standing  -     Verify surgical site; Standing  -     Verify informed consent; Standing  -     Clip and Prep Other (please specifiy) (lateral ankle); Standing  -     Full code  -     Insert peripheral IV    Moderate left ankle sprain, sequela  -     Ambulatory referral to Family Practice  -     Case Request Operating Room: ARTHROSCOPY, ANKLE, REPAIR, LIGAMENT, ANKLE  -     Place in Outpatient; Standing  -     Full code; Standing  -     Insert peripheral IV; Standing  -     Verify surgical site; Standing  -     Verify informed consent; Standing  -     Clip and Prep Other (please specifiy) (lateral ankle); Standing  -     Full code  -     Insert peripheral IV    Ankle instability, left  -     Ambulatory referral to Family Practice  -     Case Request Operating Room: ARTHROSCOPY, ANKLE, REPAIR, LIGAMENT, ANKLE  -     Place in Outpatient; Standing  -     Full code; Standing  -     Insert peripheral IV; Standing  -     Verify surgical site; Standing  -     Verify informed consent; Standing  -     Clip and Prep Other  (please specifiy) (lateral ankle); Standing  -     Full code  -     Insert peripheral IV    Osteochondral lesion of talar dome  -     Ambulatory referral to [facility]  -     Case Request Operating Room: ARTHROSCOPY, ANKLE, REPAIR, LIGAMENT, ANKLE  -     Place in Outpatient; Standing  -     Full code; Standing  -     Insert peripheral IV; Standing  -     Verify surgical site; Standing  -     Verify informed consent; Standing  -     Clip and Prep Other (please specifiy) (lateral ankle); Standing  -     Full code  -     Insert peripheral IV    Preop testing  -     Ambulatory referral to [facility]    Other orders  -     sodium chloride 0.9% flush 10 mL  -     lidocaine (PF) 10 mg/ml (1%) injection 10 mg  -     Weight bearing Non Weight Bearing; Lower Extremity; Left; Standing  -     ceFAZolin (ANCEF) 2 g in dextrose 5 % 50 mL IVPB  -     Weight bearing Non Weight Bearing; Lower Extremity; Left      I counseled the patient on his conditions, their implications and medical management.    Continue Gabapentin     Continue in Cam boot.      RICE    Imaging reviewed with OCD lesion and ruptured ATFL noted. Discussed surgical correction with arthroscopy of ankle and repair of ligament and possible peroneal tendons. Pt in agreement.    Long discussion with patient regarding the procedure in detail. Patient understands all risks, potential complications, and alternatives, including, but not limited to those listed on the consent form. All questions were answered. No guarantees given or implied as to outcome. Informed verbal and written consent was obtained. Consent forms read, signed, witnessed. Patient for surgery    Suspect possible underlying neuropathy or chronic regional pain syndrome to the left ankle region as well. Discussed in detail that surgical correction may not eliminate all symptoms or could exacerbate current symptoms.    RTC 1 week post op for f/u     Santa Schumacher DPM, PGY3    I have personally  taken the history and examined this patient and agree with the resident's note as stated as above.   Kole Clark DPM, FACRODNEY    Referred to PCP for medical optimization and risk stratification    Scheduled at [facility] as MAC with local on 6/12/19    Will need to remain NWB left postop.

## 2019-04-23 ENCOUNTER — HOSPITAL ENCOUNTER (OUTPATIENT)
Dept: RADIOLOGY | Facility: HOSPITAL | Age: 44
Discharge: HOME | End: 2019-04-23
Attending: ORTHOPAEDIC SURGERY
Payer: COMMERCIAL

## 2019-04-23 ENCOUNTER — OFFICE VISIT (OUTPATIENT)
Dept: ORTHOPEDICS | Facility: CLINIC | Age: 44
End: 2019-04-23
Payer: COMMERCIAL

## 2019-04-23 VITALS — WEIGHT: 250 LBS | HEIGHT: 70 IN | BODY MASS INDEX: 35.79 KG/M2

## 2019-04-23 DIAGNOSIS — M79.641 PAIN OF RIGHT HAND: ICD-10-CM

## 2019-04-23 DIAGNOSIS — M79.641 PAIN OF RIGHT HAND: Primary | ICD-10-CM

## 2019-04-23 PROCEDURE — 99999 PR PBB SHADOW E&M-EST. PATIENT-LVL II: ICD-10-PCS | Mod: PBBFAC,,, | Performed by: ORTHOPAEDIC SURGERY

## 2019-04-23 PROCEDURE — 99999 PR PBB SHADOW E&M-EST. PATIENT-LVL II: CPT | Mod: PBBFAC,,, | Performed by: ORTHOPAEDIC SURGERY

## 2019-04-23 PROCEDURE — 99213 PR OFFICE/OUTPT VISIT, EST, LEVL III, 20-29 MIN: ICD-10-PCS | Mod: S$GLB,,, | Performed by: ORTHOPAEDIC SURGERY

## 2019-04-23 PROCEDURE — 73120 X-RAY EXAM OF HAND: CPT | Mod: 26,RT,, | Performed by: RADIOLOGY

## 2019-04-23 PROCEDURE — 73120 X-RAY EXAM OF HAND: CPT | Mod: TC,PN,RT

## 2019-04-23 PROCEDURE — 99213 OFFICE O/P EST LOW 20 MIN: CPT | Mod: S$GLB,,, | Performed by: ORTHOPAEDIC SURGERY

## 2019-04-23 PROCEDURE — 73120 XR HAND 2 VIEW RIGHT: ICD-10-PCS | Mod: 26,RT,, | Performed by: RADIOLOGY

## 2019-04-23 NOTE — PROGRESS NOTES
"Subjective:      Patient ID: Pb oMntenegro is a 43 y.o. male.  Chief Complaint: Pain of the Right Hand      HPI  Pb Montenegro is a  43 y.o. male presenting today for follow up of right hand symptoms.  He reports that he is about the same  Basically he is having ongoing pain in the right hand as well as numbness in the right small finger  Last visit in February I made referral to a neurologist.  According to the patient he saw the neurologist but did not get a report and really no explanation after the appointment  He does tell me that there was no nerve test performed.  Previous nerve test was normal.  He is basically about the same in terms of symptoms.    Review of patient's allergies indicates:   Allergen Reactions    Tuberculin ppd chris test Hives         Current Outpatient Medications   Medication Sig Dispense Refill    gabapentin (NEURONTIN) 300 MG capsule Take 1 capsule (300 mg total) by mouth 3 (three) times daily. 90 capsule 0    VIMOVO 500-20 mg TbID Take 1 tablet by mouth 2 (two) times daily.  3    hydrocodone-acetaminophen 7.5-325mg (NORCO) 7.5-325 mg per tablet Take 1 tablet by mouth every 4 (four) hours as needed. 40 tablet 0    naproxen (EC NAPROSYN) 500 MG EC tablet Take 1 tablet (500 mg total) by mouth 2 (two) times daily with meals. 60 tablet 1     Current Facility-Administered Medications   Medication Dose Route Frequency Provider Last Rate Last Dose    sodium chloride 0.9% flush 10 mL  10 mL Intravenous PRN Kole Clark DPM           Past Medical History:   Diagnosis Date    Arthritis        Past Surgical History:   Procedure Laterality Date    CERVICAL FUSION  2010    CLOSED REDUCTION HAND FRACTURE Right 1991    KNEE ARTHROSCOPY Bilateral 1990's    OPEN ANTERIOR SHOULDER RECONSTRUCTION Left 1993    PINNING-CLOSED-HAND Right 3/2/2018    [facility] OR    TONSILLECTOMY         OBJECTIVE:   PHYSICAL EXAM:  Height: 5' 10" (177.8 cm) " "Weight: 113.4 kg (250 lb)  Vitals:    04/23/19 1035   Weight: 113.4 kg (250 lb)   Height: 5' 10" (1.778 m)   PainSc:   4     Ortho/SPM Exam  Examination right hand there is no swelling no tenderness range of motion full he does have an abnormal posture to the small finger with ulnar deviation  There is some weakness of the intrinsics particularly of the small finger sensation subjectively decreased in the small finger but there is no atrophy of the intrinsic muscles      RADIOGRAPHS:  AP and lateral x-rays right hand demonstrate healed fracture no significant arthritic changes of the joint  Comments: I have personally reviewed the imaging and I agree with the above radiologist's report.    ASSESSMENT/PLAN:     IMPRESSION:  Ongoing pain right hand after previous 5th metacarpal fracture    PLAN:  Without the Neurology report I really can't make any new determination so I would like to try to get that report from the doctor  The patient will also work through his  to get the report  It is possible that the exam was normal with the neurologist and in that  case we really at the endpoint here in terms of the patient's hand symptoms  As far as I am concerned he can continue full duty related to the hand  He has reached maximal medical improvement but I would like to get the report before I make any determination about impairment rating follow-up     FOLLOW UP:  After we get the Neurology report    Disclaimer: This note has been generated using voice-recognition software. There may be typographical errors that have been missed during proof-reading.  "

## 2019-04-29 ENCOUNTER — TELEPHONE (OUTPATIENT)
Dept: PODIATRY | Facility: CLINIC | Age: 44
End: 2019-04-29

## 2019-04-29 NOTE — TELEPHONE ENCOUNTER
----- Message from Caleb Orourke sent at 4/29/2019 10:21 AM CDT -----  Contact: self/[phone]  He needs to confirm surgery date and check on the status of the pain medication being called in.

## 2019-04-30 ENCOUNTER — TELEPHONE (OUTPATIENT)
Dept: ORTHOPEDICS | Facility: CLINIC | Age: 44
End: 2019-04-30

## 2019-04-30 RX ORDER — HYDROCODONE BITARTRATE AND ACETAMINOPHEN 7.5; 325 MG/1; MG/1
1 TABLET ORAL EVERY 8 HOURS PRN
Qty: 60 TABLET | Refills: 0 | Status: SHIPPED | OUTPATIENT
Start: 2019-04-30 | End: 2019-05-23 | Stop reason: SDUPTHER

## 2019-04-30 NOTE — TELEPHONE ENCOUNTER
Please let him know that surgery is scheduled 6/12/19. His pain medicine prescription is sent to the pharmacy on file.

## 2019-04-30 NOTE — TELEPHONE ENCOUNTER
----- Message from Krystin Obrien sent at 4/30/2019 12:01 PM CDT -----  Contact: [facility], [phone]  States insurance is requiring a 7 day supply for Norco prescription unless doctor gives an overide to fill it.

## 2019-05-02 ENCOUNTER — TELEPHONE (OUTPATIENT)
Dept: PODIATRY | Facility: CLINIC | Age: 44
End: 2019-05-02

## 2019-05-08 ENCOUNTER — TELEPHONE (OUTPATIENT)
Dept: PODIATRY | Facility: CLINIC | Age: 44
End: 2019-05-08

## 2019-05-08 NOTE — TELEPHONE ENCOUNTER
----- Message from Etelvina Carey sent at 5/8/2019 10:29 AM CDT -----  Contact: richardson hatfield ()  Type: Needs Medical Advice    Who Called: richardson hatfield ()  Best Call Back Number: [phone]  Additional Information: would like for the nurse to give her a call back in regards to the patient.

## 2019-05-22 ENCOUNTER — TELEPHONE (OUTPATIENT)
Dept: PODIATRY | Facility: CLINIC | Age: 44
End: 2019-05-22

## 2019-05-22 NOTE — TELEPHONE ENCOUNTER
----- Message from Radha Davalos sent at 5/22/2019  1:21 PM CDT -----  Contact: self / [phone]  Patient is requesting a refill on the below. Please advise'      HYDROcodone-acetaminophen (NORCO) 7.5-325 mg per tablet      Pharmacy     [id number] - [place], [place] - [address] [facility]

## 2019-05-27 RX ORDER — GABAPENTIN 300 MG/1
300 CAPSULE ORAL 3 TIMES DAILY
Qty: 90 CAPSULE | Refills: 0 | Status: SHIPPED | OUTPATIENT
Start: 2019-05-27 | End: 2019-07-02 | Stop reason: SDUPTHER

## 2019-05-27 RX ORDER — HYDROCODONE BITARTRATE AND ACETAMINOPHEN 7.5; 325 MG/1; MG/1
TABLET ORAL
Qty: 60 TABLET | Refills: 0 | Status: SHIPPED | OUTPATIENT
Start: 2019-05-27 | End: 2019-06-17 | Stop reason: SDUPTHER

## 2019-06-17 RX ORDER — HYDROCODONE BITARTRATE AND ACETAMINOPHEN 7.5; 325 MG/1; MG/1
1 TABLET ORAL EVERY 8 HOURS PRN
Qty: 60 TABLET | Refills: 0 | Status: SHIPPED | OUTPATIENT
Start: 2019-06-17 | End: 2019-07-02 | Stop reason: SDUPTHER

## 2019-06-24 ENCOUNTER — OFFICE VISIT (OUTPATIENT)
Dept: PODIATRY | Facility: CLINIC | Age: 44
End: 2019-06-24
Payer: COMMERCIAL

## 2019-06-24 VITALS — WEIGHT: 250 LBS | HEIGHT: 70 IN | BODY MASS INDEX: 35.79 KG/M2

## 2019-06-24 DIAGNOSIS — M79.2 NEUROPATHIC PAIN OF LEFT LOWER EXTREMITY: ICD-10-CM

## 2019-06-24 DIAGNOSIS — M25.572 CHRONIC PAIN OF LEFT ANKLE: Primary | ICD-10-CM

## 2019-06-24 DIAGNOSIS — G89.29 CHRONIC PAIN OF LEFT ANKLE: Primary | ICD-10-CM

## 2019-06-24 PROCEDURE — 99213 PR OFFICE/OUTPT VISIT, EST, LEVL III, 20-29 MIN: ICD-10-PCS | Mod: S$GLB,,, | Performed by: PODIATRIST

## 2019-06-24 PROCEDURE — 99999 PR PBB SHADOW E&M-EST. PATIENT-LVL III: CPT | Mod: PBBFAC,,, | Performed by: PODIATRIST

## 2019-06-24 PROCEDURE — 99213 OFFICE O/P EST LOW 20 MIN: CPT | Mod: S$GLB,,, | Performed by: PODIATRIST

## 2019-06-24 PROCEDURE — 99999 PR PBB SHADOW E&M-EST. PATIENT-LVL III: ICD-10-PCS | Mod: PBBFAC,,, | Performed by: PODIATRIST

## 2019-06-24 NOTE — PROGRESS NOTES
"Subjective:      Patient ID: Pb Montenegro is a 44 y.o. male.    Chief Complaint: Follow-up (left ankle pain )    Pb Montenegro is a 44 y.o. male who  has a past medical history of Arthritis.    Patient states that [place] pop when he stepped down off of the beltway, followed by pain and swelling.  He went to an Urgent care who told him nothing was broken, gave him a boot, and told him to follow up with podiatry.  Patient states swelling has gone down, but pain has persisted.  Points to lateral and posterior ankle.  States it hurts to flex his foot.  States he is unable to work, states it is too painful and he is too unstable to get around without his boot.  Interestingly with chart check note that the patient has a history of nerve pain to the right hand treated per Dr. Dutta with gabapentin 300 mg nightly.  Reports no dizziness.  No other complaints noted.    4/15/19: Pt presents for left ankle f/u, s/p MRI. No new pedal complaints    06/24/2019:  Follow-up for chronic left ankle pain.  His previous surgical intervention was scheduled was canceled per worker's compensation.  States that he has since gone the lower which has now dealing directly with his claims person at worker's Comp.  He also relates that the hydrocodone 7.5 mg only helps for to 3 hr and that he is developing resistance to it.  He is taking gabapentin 300 mg t.i.d..  If he tries to take 400 mg to make some a little loopy.  Says overall the pain is getting worse and that he is frustrated because he feels like his care is being neglected.  He ambulates with a tall orthopedic boot to left lower extremity which allows him to function somewhat.    Vitals:    06/24/19 1005   Weight: 113.4 kg (250 lb)   Height: 5' 10" (1.778 m)   PainSc:   9      Past Medical History:   Diagnosis Date    Arthritis        Past Surgical History:   Procedure Laterality Date    CERVICAL FUSION  2010    CLOSED REDUCTION " HAND FRACTURE Right 1991    KNEE ARTHROSCOPY Bilateral 1990's    OPEN ANTERIOR SHOULDER RECONSTRUCTION Left 1993    PINNING-CLOSED-HAND Right 3/2/2018    Performed by [facility] Jr[facility]    TONSILLECTOMY         No family history on file.    Social History     Socioeconomic History    Marital status:      Spouse name: Not on file    Number of children: Not on file    Years of education: Not on file    Highest education level: Not on file   Occupational History    Not on file   Social Needs    Financial resource strain: Very hard    Food insecurity:     Worry: Sometimes true     Inability: Sometimes true    Transportation needs:     Medical: No     Non-medical: Not on file   Tobacco Use    Smoking status: Current Every Day Smoker    Smokeless tobacco: Never Used   Substance and Sexual Activity    Alcohol use: Yes     Frequency: Monthly or less     Drinks per session: 1 or 2     Binge frequency: Never     Comment: social    Drug use: No    Sexual activity: Not on file   Lifestyle    Physical activity:     Days per week: 0 days     Minutes per session: Patient refused    Stress: Very much   Relationships    Social connections:     Talks on phone: Three times a week     Gets together: Once a week     Attends Yazidi service: Not on file     Active member of club or organization: Yes     Attends meetings of clubs or organizations: Patient refused     Relationship status:    Other Topics Concern    Not on file   Social History Narrative    Not on file       Current Outpatient Medications   Medication Sig Dispense Refill    gabapentin (NEURONTIN) 300 MG capsule Take 1 capsule (300 mg total) by mouth 3 (three) times daily. 90 capsule 0    HYDROcodone-acetaminophen (NORCO) 7.5-325 mg per tablet Take 1 tablet by mouth every 8 (eight) hours as needed. 60 tablet 0    naproxen (EC NAPROSYN) 500 MG EC tablet Take 1 tablet (500 mg total) by mouth 2 (two) times daily with  meals. 60 tablet 1    VIMOVO 500-20 mg TbID Take 1 tablet by mouth 2 (two) times daily.  3     Current Facility-Administered Medications   Medication Dose Route Frequency Provider Last Rate Last Dose    sodium chloride 0.9% flush 10 mL  10 mL Intravenous PRN Kole Clark DPM           Review of patient's allergies indicates:   Allergen Reactions    Tuberculin ppd chris test Hives         Review of Systems   Constitution: Negative for chills and fever.   HENT: Negative for congestion.    Cardiovascular: Positive for leg swelling. Negative for chest pain and claudication.   Respiratory: Negative for cough and shortness of breath.    Skin: Negative for color change, dry skin, poor wound healing, suspicious lesions and unusual hair distribution.   Musculoskeletal: Positive for back pain, joint pain, joint swelling, muscle weakness and myalgias. Negative for falls.   Gastrointestinal: Negative for nausea and vomiting.   Neurological: Negative for loss of balance, numbness, paresthesias, sensory change and tremors.   Psychiatric/Behavioral: Negative for altered mental status. The patient is not nervous/anxious.            Objective:      Physical Exam   Constitutional: He is oriented to person, place, and time. He appears well-developed and well-nourished. No distress.   HENT:   Head: Atraumatic.   Neck: No tracheal deviation present.   Cardiovascular: Intact distal pulses.   Pulses:       Dorsalis pedis pulses are 2+ on the right side, and 2+ on the left side.        Posterior tibial pulses are 2+ on the right side, and 2+ on the left side.   Musculoskeletal: He exhibits edema and tenderness. He exhibits no deformity.   Pain to ATFL, medial and lateral ankle gutters and achilles tendon just proximal to insertion.  Mild pain to peroneal tendons.  Muscle strength to the left foot ankle region is noted to be +4/5 and guarded.  Range of motion of the left ankle and foot does produce a moderate amount of pain.  Pain  elicited with light touch to the lateral left ankle in overlying the Achilles tendon region.  No palpable defect felt over the Achilles tendon. Majority the pain is to the middle aspect of the Achilles tendon. There is also some pain along the posterior tibial and peroneal tendon distributions left ankle.    Anterior drawer test questionable, stress eversion test guarded         Neurological: He is alert and oriented to person, place, and time. He displays atrophy. No sensory deficit.   Reflex Scores:       Patellar reflexes are 2+ on the right side and 2+ on the left side.       Achilles reflexes are 2+ on the right side and 2+ on the left side.  Negative tinels sign.  Muscle guarding with attempted range of motion of the left ankle and foot.   Skin: Skin is warm, dry and intact. Capillary refill takes less than 2 seconds. No ecchymosis, no lesion, no petechiae and no rash noted. He is not diaphoretic. No cyanosis or erythema. No pallor. Nails show no clubbing.     Otherwise no open wounds, interdigital macerations, or calluses.  .       Psychiatric: He has a normal mood and affect. His behavior is normal.             Assessment:       Encounter Diagnoses   Name Primary?    Chronic pain of left ankle Yes    Neuropathic pain of left lower extremity          Plan:       Pb was seen today for follow-up.    Diagnoses and all orders for this visit:    Chronic pain of left ankle  -     Ambulatory Referral to Pain Clinic  -     EMG W/ ULTRASOUND AND NERVE CONDUCTION TEST 1 Extremity; Future    Neuropathic pain of left lower extremity  -     Ambulatory Referral to Pain Clinic  -     EMG W/ ULTRASOUND AND NERVE CONDUCTION TEST 1 Extremity; Future      I counseled the patient on his conditions, their implications and medical management.    Discussed with patient that he may have neuropathic pain versus chronic regional pain syndrome of the left lower extremity.  I would prefer that he see interventional pain  management in order to further assess this and determine the next course of action.  I would defer surgical intervention at this time especially since his pain seems to be getting worse over time.  Relates the patient that his  should contact the office in order to help coordinate his care.    His pain is too great at this point in order to referred physical therapy however if he can receive sympathetic nerve injections or dorsal root ganglion stimulation maybe he could participate therapy and get better.  At that time we may even consider arthroscopic exploration left ankle determine if there is any significant pathology that could be addressed.    I have also ordered EMG determine if there is any impingement syndrome coming from his low back secondary to chronic back pain.

## 2019-06-27 ENCOUNTER — TELEPHONE (OUTPATIENT)
Dept: PODIATRY | Facility: CLINIC | Age: 44
End: 2019-06-27

## 2019-06-28 NOTE — TELEPHONE ENCOUNTER
Left message on patient's vmail asking to return my call and also to let him know that he will have to go to [facility] medical to get his EMG w/US Nerve Conduction test done.

## 2019-07-02 ENCOUNTER — DOCUMENTATION ONLY (OUTPATIENT)
Dept: ORTHOPEDICS | Facility: CLINIC | Age: 44
End: 2019-07-02

## 2019-07-02 NOTE — PROGRESS NOTES
CONFERENCE AS FOLLOWS:  I met with the patient and the  for his   Workmen's Comp claim related to the injury of 07/02/2019.  We reviewed the patient's chart, previous   surgery, x-rays and a recent neurologic evaluation regarding problems he is   having in his right hand.  In reviewing the Neurology report, it appears that   the patient does have an injury to the ulnar digital nerve in the right small   Finger  which is most likely related to the previous fracture of the fifth   Metacarpal. However, he also had evidence of carpal tunnel syndrome and I   explained  That this was a different problem and  most likely not related to the previous fracture.    The patient has reached maximal medical improvement regarding his injury to the   hand, which included a fracture of the fifth metacarpal  neck, which   required surgery as well as injury to the ulnar digital nerve of the small   finger.  The surgery was performed on 03/02/2018, so he is now more than one   year postoperative.    He has previously been released to full duty regarding the right hand and he has   no restrictions related to the right hand.    He has reached maximum medical improvement and he does have a slight impairment   related to the loss of motion and loss of feeling in the small finger.  Based on   the AMA guidelines, sixth edition, to permanent impairment, page 444, reviewing   the peripheral nerve impairment chart, I would classify this as a class IA   impairment of the upper extremity of 5%.  The patient will follow up with me on   an as needed basis only.      MATY  dd: 07/02/2019 17:49:00 (CDT)  td: 07/02/2019 23:58:06 (CDT)  Doc ID   #[id number]  Job ID #[id number]    CC: Workmen's Comp

## 2019-07-03 RX ORDER — GABAPENTIN 300 MG/1
300 CAPSULE ORAL 3 TIMES DAILY
Qty: 90 CAPSULE | Refills: 5 | Status: SHIPPED | OUTPATIENT
Start: 2019-07-03 | End: 2019-08-02

## 2019-07-03 RX ORDER — HYDROCODONE BITARTRATE AND ACETAMINOPHEN 7.5; 325 MG/1; MG/1
1 TABLET ORAL EVERY 8 HOURS PRN
Qty: 60 TABLET | Refills: 0 | Status: SHIPPED | OUTPATIENT
Start: 2019-07-03 | End: 2019-07-24 | Stop reason: ALTCHOICE

## 2019-07-05 ENCOUNTER — TELEPHONE (OUTPATIENT)
Dept: PODIATRY | Facility: CLINIC | Age: 44
End: 2019-07-05

## 2019-07-05 NOTE — TELEPHONE ENCOUNTER
----- Message from Cecily Lomeli sent at 7/5/2019  2:24 PM CDT -----  Good afternoon,  I received a call from Bethany @ [facility] [phone] regarding the patients long term use of Hydrocodone. Please call Bethany to discuss.    Thanks,  Cecily  EXT [phone]

## 2019-07-09 ENCOUNTER — TELEPHONE (OUTPATIENT)
Dept: PODIATRY | Facility: CLINIC | Age: 44
End: 2019-07-09

## 2019-07-09 NOTE — TELEPHONE ENCOUNTER
----- Message from Nicolas Martinez sent at 7/8/2019  3:42 PM CDT -----  Contact: Kristal guillory/[facility] [phone]  Kristal is calling to speak with you concerning the patient medications  Please call back to assist at [phone]

## 2019-07-24 ENCOUNTER — TELEPHONE (OUTPATIENT)
Dept: PODIATRY | Facility: CLINIC | Age: 44
End: 2019-07-24

## 2019-07-24 ENCOUNTER — PATIENT MESSAGE (OUTPATIENT)
Dept: PODIATRY | Facility: CLINIC | Age: 44
End: 2019-07-24

## 2019-07-24 RX ORDER — HYDROCODONE BITARTRATE AND ACETAMINOPHEN 10; 325 MG/1; MG/1
1 TABLET ORAL EVERY 8 HOURS PRN
Qty: 28 TABLET | Refills: 0 | Status: SHIPPED | OUTPATIENT
Start: 2019-07-24

## 2019-07-24 NOTE — TELEPHONE ENCOUNTER
Spoke with patient and he relates he has appointment with Pain Management Physician on [facility]. He is going to reply via [facility] with name and address. I instructed to increase his gabapentin to 600 mg po tid as related in previous messages to staff over course of 6 days.

## 2019-07-24 NOTE — TELEPHONE ENCOUNTER
Please obtain the number for the  with his worker's company and arrange for a discussion today. I will be coming by the clinic today around 4 pm.

## 2019-07-24 NOTE — TELEPHONE ENCOUNTER
Please notify the patient that I can not keep authorizing his pain medication. Please call his  and obtain the request form for him to be referred to Pain Management. He can take gabapentin 600 mg at night x 3 days then increase to 600 mg morning and night and 300 mg at mid day x 3 days then increased to 600 mg three times daily. Hydrocodone is not a good medication to treat this condition and I do not condone it. I only treat acute pain conditions. He would also need to come in temporarily for pain contract and drug screening for any further prescriptions temporarily until he can get set up with pain management. I need to follow the Federal guidelines.

## 2019-08-07 DIAGNOSIS — G90.522 COMPLEX REGIONAL PAIN SYNDROME I OF LEFT LOWER LIMB: Primary | ICD-10-CM

## 2019-08-15 ENCOUNTER — HOSPITAL ENCOUNTER (OUTPATIENT)
Dept: RADIOLOGY | Facility: HOSPITAL | Age: 44
Discharge: HOME | End: 2019-08-15
Attending: ANESTHESIOLOGY
Payer: COMMERCIAL

## 2019-08-15 DIAGNOSIS — G90.522 COMPLEX REGIONAL PAIN SYNDROME I OF LEFT LOWER LIMB: ICD-10-CM

## 2019-08-15 PROCEDURE — A9503 TC99M MEDRONATE: HCPCS | Mod: PO

## 2023-11-30 NOTE — PATIENT INSTRUCTIONS
Discussed that  needs to be present for appointment.    Need referral to Pain Management to manage neuropathic pain.   30-Nov-2023 12:17

## (undated) DEVICE — GLOVE SURG BIOGEL LATEX SZ 7.5

## (undated) DEVICE — SEE MEDLINE ITEM 152522

## (undated) DEVICE — ELECTRODE REM PLYHSV RETURN 9

## (undated) DEVICE — SEE MEDLINE ITEM 146268

## (undated) DEVICE — PAD ABDOMINAL 5X9 STERILE

## (undated) DEVICE — BLADE SURG #15 CARBON STEEL

## (undated) DEVICE — SEE MEDLINE ITEM 156955

## (undated) DEVICE — SEE MEDLINE ITEM 157116

## (undated) DEVICE — PADDING CAST SPECIALIST 3X4YD

## (undated) DEVICE — DRAPE STERI INSTRUMENT 1018

## (undated) DEVICE — SUT SILK 0 SH 30IN BLK BR

## (undated) DEVICE — SUT CTD VICRYL 2.0

## (undated) DEVICE — STOCKINET 4INX48

## (undated) DEVICE — BANDAGE ELASTIC 2X5 VELCRO ST

## (undated) DEVICE — SUT MONOCRYL 3-0 PS-2 UND

## (undated) DEVICE — Device

## (undated) DEVICE — SEE MEDLINE ITEM 157173

## (undated) DEVICE — PAD CAST 2 IN X 4YDS STERILE

## (undated) DEVICE — SEE MEDLINE ITEM 157117

## (undated) DEVICE — TOURNIQUET SB QC DP 18X4IN

## (undated) DEVICE — COVER OVERHEAD SURG LT BLUE

## (undated) DEVICE — SEE MEDLINE ITEM 146308

## (undated) DEVICE — GAUZE SPONGE 4X4 12PLY

## (undated) DEVICE — BANDAGE ELASTIC 3IN ACE NS

## (undated) DEVICE — SEE MEDLINE ITEM 157131

## (undated) DEVICE — DRESSING XEROFORM FOIL PK 1X8

## (undated) DEVICE — DRAPE MINI C-ARM 54 X 64

## (undated) DEVICE — MANIFOLD 4 PORT

## (undated) DEVICE — SEE MEDLINE ITEM 152622

## (undated) DEVICE — SLING ARM FASHION NAVY X-LG

## (undated) DEVICE — PACK BASIC

## (undated) DEVICE — APPLICATOR CHLORAPREP ORN 26ML

## (undated) DEVICE — SPONGE LAP 18X18 PREWASHED

## (undated) DEVICE — SHEET DRAPE MEDIUM

## (undated) DEVICE — SUT VICRYL 3-0 27 SH

## (undated) DEVICE — BLADE SCALP OPHTL BEVEL STR

## (undated) DEVICE — INSTRUMENT SUCTION FRAZIER 12F

## (undated) DEVICE — DRESSING XEROFORM 1X8IN